# Patient Record
Sex: FEMALE | Race: WHITE | NOT HISPANIC OR LATINO | Employment: UNEMPLOYED | URBAN - NONMETROPOLITAN AREA
[De-identification: names, ages, dates, MRNs, and addresses within clinical notes are randomized per-mention and may not be internally consistent; named-entity substitution may affect disease eponyms.]

---

## 2016-08-29 LAB
EXTERNAL ABO GROUPING: NORMAL
EXTERNAL ANTIBODY SCREEN: NEGATIVE
EXTERNAL CHLAMYDIA SCREEN: NEGATIVE
EXTERNAL GONORRHEA SCREEN: NEGATIVE
EXTERNAL HEMATOCRIT: 37 %
EXTERNAL HEMOGLOBIN: 12.5 G/DL
EXTERNAL HEPATITIS B SURFACE ANTIGEN: NEGATIVE
EXTERNAL RH FACTOR: POSITIVE
EXTERNAL RUBELLA QUALITATIVE: NORMAL
EXTERNAL URINE DRUG SCREEN: NORMAL
HIV1 AB SPEC QL IA.RAPID: NEGATIVE

## 2017-01-16 ENCOUNTER — HOSPITAL ENCOUNTER (OUTPATIENT)
Facility: HOSPITAL | Age: 22
Discharge: HOME | End: 2017-01-16
Attending: OBSTETRICS & GYNECOLOGY | Admitting: OBSTETRICS & GYNECOLOGY

## 2017-01-16 ENCOUNTER — HOSPITAL ENCOUNTER (OUTPATIENT)
Dept: LABOR AND DELIVERY | Facility: HOSPITAL | Age: 22
Discharge: HOME | End: 2017-01-16

## 2017-01-16 VITALS
SYSTOLIC BLOOD PRESSURE: 122 MMHG | HEIGHT: 63 IN | RESPIRATION RATE: 18 BRPM | BODY MASS INDEX: 19.42 KG/M2 | TEMPERATURE: 98.6 F | HEART RATE: 86 BPM | DIASTOLIC BLOOD PRESSURE: 64 MMHG | WEIGHT: 109.6 LBS

## 2017-01-16 RX ORDER — ACETAMINOPHEN 325 MG/1
650 TABLET ORAL EVERY 6 HOURS PRN
Status: ON HOLD | COMMUNITY
End: 2017-01-19

## 2017-01-16 NOTE — IP AVS SNAPSHOT
AFTER VISIT SUMMARY             Florencia Vitale           About your hospitalization     You were admitted on:  January 16, 2017 You last received care in the:  [facility] LABOR DELIVERY       Procedures & Surgeries         Medications    If you or your caregiver advised us that you are currently taking a medication and that medication is marked below as “Resume”, this simply indicates that we have reviewed those medications to make sure our new therapy recommendations do not interfere.  If you have concerns about medications other than those new ones which we are prescribing today, please consult the physician who prescribed them (or your primary physician).  Our review of your home medications is not meant to indicate that we are directing their use.             Your Medications      ASK your doctor about these medications     acetaminophen 325 MG tablet   Take 650 mg by mouth Every 6 (Six) Hours As Needed for mild pain (1-3).   Commonly known as:  TYLENOL                      Your Medications      ASK your doctor about these medications           Morning Noon Evening Bedtime As Needed    acetaminophen 325 MG tablet   Take 650 mg by mouth Every 6 (Six) Hours As Needed for mild pain (1-3).   Commonly known as:  TYLENOL                                         Instructions for After Discharge        Discharge References/Attachments     INTRAUTERINE GROWTH RESTRICTION (ENGLISH)    OLIGOHYDRAMNIOS (ENGLISH)       Follow-ups for After Discharge        Scheduled Appointments     Jan 23, 2017  4:00 PM EST   NST with COR LD PROCEDURE ROOM   [facility] LABOR AND DELIVERT PROCEDURES ([place])    [address]  [facility] [address]   [phone]              [employer] Signup     [facility] [employer] allows you to send messages to your doctor, view your test results, renew your prescriptions, schedule appointments, and more. To sign up, go to [employer] and click on the Sign Up Now link  in the New User? box. Enter your [facility] Activation Code exactly as it appears below along with the last four digits of your Social Security Number and your Date of Birth () to complete the sign-up process. If you do not sign up before the expiration date, you must request a new code.    [facility] Activation Code: [email or web address]  Expires: 2017 11:34 AM    If you have questions, you can email [email or web address] or call [phone] to talk to our [facility] staff. Remember, [facility] is NOT to be used for urgent needs. For medical emergencies, dial 911.           Summary of Your Hospitalization        Reason for Hospitalization     Your primary diagnosis was:  Not on File      Care Providers     Provider Service Role Specialty    Leon Cooney DO -- Attending Provider Obstetrics and Gynecology      Your Allergies  Date Reviewed: 2017    No active allergies      Patient Belongings Returned     Document Return of Belongings Flowsheet     Were the patient bedside belongings sent home?   --   Belongings Retrieved from Security & Sent Home   --    Belongings Sent to Safe   --   Medications Retrieved from Pharmacy & Sent Home   --              More Information      Intrauterine Growth Restriction  Intrauterine growth restriction (IUGR) is when your baby is not growing normally during your pregnancy. A baby with IUGR is smaller than it should be and may weigh less than normal at birth.   IUGR can result if there is a problem with the organ that supplies your baby with oxygen and nutrition (placenta). Usually, there is no way to prevent this type of problem.   CAUSES   The most common cause of IUGR is a problem with the placenta or umbilical cord that causes your developing baby to get less oxygen or nutrition than needed. Other causes include:  · Poor maternal nutrition.  · Chemicals found in substances such as cigarettes, alcohol, and some illegal drugs.  · Some prescription  medicines.  · Congenital defects.  · Genetic disorders.  · An infection.  · Carrying more than one baby, such as twins or triplets (multiple gestations).  RISK FACTORS  This condition is more likely to develop in women who:  · Are over the age of 35 at the time of delivery (advanced maternal age).  · Have medical conditions such as high blood pressure, diabetes, heart or kidney disease, anemia, or conditions that increase the risk for blood clotting.  · Live at a very high altitude during pregnancy.  · Have a personal history or family history of IUGR.  · Take medicines during pregnancy that are linked with congenital disabilities.  · Have a personal or family history of a genetic disorder.  · Come into contact with infected cat feces (toxoplasmosis).  · Come into contact with chickenpox (varicella) or [place] citizen measles (rubella).  · Have or are at risk of getting an infectious disease such as syphilis, HIV, or herpes.  · Eat poorly during their pregnancy.  · Weigh less than 100 pounds.  · Have a family history of multiple gestations.  · Have had infertility treatments.    · Use tobacco, illegal drugs, or drink alcohol during pregnancy.  SYMPTOMS  IUGR does not cause many symptoms. You might notice that your baby does not move or kick very often. Also, your belly may not be as big as expected for the stage of your pregnancy.  DIAGNOSIS  This condition is diagnosed with physical and prenatal exams. Your health care provider will measure the size of your baby inside your womb during a routine screening exam using sound waves (ultrasound). Your health care provider will compare the size of your baby to the size of other babies at the same stage of development (gestational age). Your health care provider will diagnose IUGR if your baby is smaller than 90 percent of all other babies at the same gestational age.     You may also have tests to find the cause of IUGR. These can include:  · Having fluid removed from your womb  to check for signs of infection or a congenital disability (amniocentesis).  · A series of tests to monitor your baby's health (fetal monitoring).  TREATMENT   In most cases, treatment for this condition focuses on stopping the cause of your baby's small growth. Your health care providers will monitor your pregnancy closely and help you manage your pregnancy. If this condition is caused by a placenta problem and the baby is not getting enough blood, treatment may include:   · Medicine to start labor and deliver your baby early (induction).  ·  delivery. In this procedure, your baby is delivered through a cut (incision) in the abdomen and womb (uterus).  HOME CARE INSTRUCTIONS   · Make sure you are eating enough calories and gaining enough weight.  · Eat a balanced diet. Work with a nutrition specialist (dietitian), if needed.    · Rest as needed. Try to get at least eight hours of sleep every night.  · Do not drink alcohol.    · Do not use illegal drugs.    · Do not use any tobacco products, including cigarettes, chewing tobacco, or e-cigarettes. If you need help quitting, ask your health care provider.  · Talk to your health care provider about steps you can take to avoid infections.  · Take medicines, vitamins, and mineral supplements only as directed by your health care provider. Make sure that your health care provider knows about all of the prescribed or over-the-counter medicines, supplements, vitamins, eye drops, and creams that you are using.  · Keep all follow-up visits as directed by your health care provider. This is important.  SEEK MEDICAL CARE IF:   · Your baby is not moving as often as before.     This information is not intended to replace advice given to you by your health care provider. Make sure you discuss any questions you have with your health care provider.     Document Released: 2009 Document Revised: 2016 Document Reviewed: 2015  Outline App Patient  Education ©2016 Elsevier Inc.          Oligohydramnios  An unborn baby (fetus) lives in the mother's uterus in a sac of amniotic fluid. This fluid:  · Protects the fetus from trauma.  · Helps the fetus move freely inside the uterus.  · Helps the fetal lungs, kidneys, and digestive system develop.  · Protects the baby from infections.    Oligohydramnios is when there is not enough amniotic fluid in the amniotic sac. If this happens early in pregnancy, a fetus might not develop normally. If this happens in the second half of a pregnancy, a fetus might not grow as much as it should and could cause problems during delivery.    Oligohydramnios can also cause:  · Pregnancy loss (miscarriage).  · Premature birth.  · Deformities of the face or body.  · Problems with muscles and bones.  · Pressure and compression on the umbilical cord, which decreases oxygen to the fetus.  · Lung problems.  · Stillbirth.  CAUSES  A cause cannot always be found. However, possible causes include:  · A leak or a tear in the amniotic sac.  · A problem with the placenta.  · Having identical twins who share the same placenta.  · A fetal birth defect. This is usually something in the fetal kidneys or urinary tract that has not developed as it should.  · A pregnancy that goes past the due date.  · Something that affects the mother's body, such as:    Dehydration.    High blood pressure.    Diabetes.    Some medications (examples include ibuprofen and blood pressure medicines).     A disease that affects the skin, joints, kidneys, and other organs (systemic lupus).    Birth defects.  SYMPTOMS  · There may be no symptoms.   · Leaking fluid from the vagina.  · Measuring smaller than usual uterus at a routine pregnancy exam.  DIAGNOSIS  To diagnose and evaluate oligohydramnios, your caregiver may:  · Order a prenatal ultrasound test. This test:    Measures the amniotic fluid level. This will show if the amount of fluid is right for the stage of  pregnancy.    Checks the fetal kidneys.    Checks fetal growth.    Evaluates the placenta.  · Confirm that you broke your water, if this is suspected by your caregiver.  · Order a nonstress test. This noninvasive test is an assessment of fetal well-being. It monitors the fetal heart rate patterns over a 20-minute period.  · Order a biophysical profile. This test measures and evaluates 5 observations of the baby (results of nonstress testing, fetal breathing, movements, muscle tone, and amount of amniotic fluid).  · Assess fetal kick counts. Tell your caregiver if the baby appears to become less active.  · Order a uterine artery Doppler study. This is a type of ultrasound. It can show if enough blood and nourishment are getting to the fetus through the placenta and umbilical cord.  · Check your blood pressure.  · Check your blood sugar.  TREATMENT  Treatment will depend on how low the fluid is, how far along in the pregnancy you are, and your overall health. Treatment options include:  · Watching and waiting. You will need to be checked more often than normal.  · Increasing your fluid intake. This may be done by mouth, or you might get the fluids through the vein (intravenously, IV).  · Delivering the baby if recommended by your caregiver.  HOME CARE INSTRUCTIONS  · Only take medicine as directed by your caregiver, especially if you have a medical problem (diabetes, high blood pressure).  · Follow your caregiver's instructions regarding physical activity, especially if you have a medical problem (diabetes, high blood pressure).  · Keep all prenatal care appointments.  · Rest as much as possible. Your caregiver may put you on bed rest.  · Drink enough fluids to keep your urine clear or pale yellow.  · Eat a healthy and nourishing diet.  · Do not smoke, drink alcohol, or take illegal drugs.   SEEK MEDICAL CARE IF:  · You have any questions or worries about your pregnancy.  · You notice a decrease in fetal  movement.  SEEK IMMEDIATE MEDICAL CARE IF:   · Fluid comes out of your vagina.  · You start to have labor pains (contractions).  · You have a fever.     This information is not intended to replace advice given to you by your health care provider. Make sure you discuss any questions you have with your health care provider.     Document Released: 04/03/2012 Document Revised: 01/08/2016 Document Reviewed: 04/03/2012  [employer] Interactive Patient Education ©2016 Elsevier Inc.            SYMPTOMS OF A STROKE    Call 911 or have someone take you to the Emergency Department if you have any of the following:    · Sudden numbness or weakness of your face, arm or leg especially on one side of the body  · Sudden confusion, diffiiculty speaking or trouble understanding   · Changes in your vision or loss of sight in one eye  · Sudden severe headache with no known cause  · sudden dizziness, trouble walking, loss of balance or coordination    It is important to seek emergency care right away if you have further stroke symptoms. If you get emergency help quickly, the powerful clot-dissolving medicines can reduce the disabilities caused by a stroke.     For more information:    American Stroke Association  1-262-9-STROKE  www.strokeassociation.org           IF YOU SMOKE OR USE TOBACCO PLEASE READ THE FOLLOWING:    Why is smoking bad for me?  Smoking increases the risk of heart disease, lung disease, vascular disease, stroke, and cancer.     If you smoke, STOP!    If you would like more information on quitting smoking, please visit the [employer] website: [email or web address]   This link will provide additional resources including the QUIT line and the Beat the Pack support groups.     For more information:    American Cancer Society  (682) 895-1118    American Heart Association  1-873.537.9584               YOU ARE THE MOST IMPORTANT FACTOR IN YOUR RECOVERY.     Follow all instructions  carefully.     I have reviewed my discharge instructions with my nurse, including the following information, if applicable:     Information about my illness and diagnosis   Follow up appointments (including lab draws)   Wound Care   Equipment Needs   Medications (new and continuing) along with side effects   Preventative information such as vaccines and smoking cessations   Diet   Pain   I know when to contact my Doctor's office or seek emergency care      I want my nurse to describe the side effects of my medications: YES NO   If the answer is no, I understand the side effects of my medications: YES NO   My nurse described the side effects of my medications in a way that I could understand: YES NO   I have taken my personal belongings and my own medications with me at discharge: YES NO            I have received this information and my questions have been answered. I have discussed any concerns I see with this plan with the nurse or physician. I understand these instructions.    Signature of Patient or Responsible Person: _____________________________________    Date: _________________  Time: __________________    Signature of Healthcare Provider: _______________________________________  Date: _________________  Time: __________________

## 2017-01-16 NOTE — NON STRESS TEST
Florencia Vitale, a  at 35w1d with an AIDA of 2017, by Last Menstrual Period, was seen at [facility] LABOR DELIVERY for a nonstress test.    Chief Complaint   Patient presents with   • Non-stress Test     iugr                Anna Marie Bob RN

## 2017-01-18 ENCOUNTER — HOSPITAL ENCOUNTER (OUTPATIENT)
Facility: HOSPITAL | Age: 22
Discharge: HOME | End: 2017-01-18
Attending: OBSTETRICS & GYNECOLOGY | Admitting: OBSTETRICS & GYNECOLOGY

## 2017-01-18 PROBLEM — Z34.90 PREGNANT: Status: ACTIVE | Noted: 2017-01-18

## 2017-01-18 LAB
A1 MICROGLOB PLACENTAL VAG QL: NEGATIVE
AMPHET+METHAMPHET UR QL: NEGATIVE
BARBITURATES UR QL SCN: NEGATIVE
BENZODIAZ UR QL SCN: NEGATIVE
BILIRUB UR QL STRIP: NEGATIVE
BUPRENORPHINE+NOR UR QL SCN: NEGATIVE
CANNABINOIDS SERPL QL: POSITIVE
CLARITY UR: CLEAR
COCAINE UR QL: NEGATIVE
COLOR UR: YELLOW
GLUCOSE UR STRIP-MCNC: NEGATIVE MG/DL
HGB UR QL STRIP.AUTO: NEGATIVE
KETONES UR QL STRIP: ABNORMAL
LEUKOCYTE ESTERASE UR QL STRIP.AUTO: NEGATIVE
METHADONE UR QL SCN: NEGATIVE
NITRITE UR QL STRIP: NEGATIVE
OPIATES UR QL: NEGATIVE
OXYCODONE UR QL SCN: NEGATIVE
PCP UR QL SCN: NEGATIVE
PH UR STRIP.AUTO: 7.5 [PH] (ref 5–8)
PROPOXYPH UR QL: NEGATIVE
PROT UR QL STRIP: NEGATIVE
SP GR UR STRIP: 1.01 (ref 1–1.03)
UROBILINOGEN UR QL STRIP: ABNORMAL

## 2017-01-19 ENCOUNTER — ANESTHESIA (OUTPATIENT)
Dept: LABOR AND DELIVERY | Facility: HOSPITAL | Age: 22
End: 2017-01-19

## 2017-01-19 ENCOUNTER — ANESTHESIA EVENT (OUTPATIENT)
Dept: LABOR AND DELIVERY | Facility: HOSPITAL | Age: 22
End: 2017-01-19

## 2017-01-19 ENCOUNTER — HOSPITAL ENCOUNTER (INPATIENT)
Facility: HOSPITAL | Age: 22
LOS: 2 days | Discharge: HOME | End: 2017-01-21
Attending: OBSTETRICS & GYNECOLOGY | Admitting: OBSTETRICS & GYNECOLOGY

## 2017-01-19 PROBLEM — Z34.90 PREGNANCY: Status: ACTIVE | Noted: 2017-01-19

## 2017-01-19 PROBLEM — O47.00 PRETERM CONTRACTIONS: Status: ACTIVE | Noted: 2017-01-19

## 2017-01-19 LAB
ABO GROUP BLD: NORMAL
AMPHET+METHAMPHET UR QL: NEGATIVE
BARBITURATES UR QL SCN: NEGATIVE
BASOPHILS # BLD AUTO: 0.03 10*3/MM3 (ref 0–0.3)
BASOPHILS NFR BLD AUTO: 0.2 % (ref 0–2)
BENZODIAZ UR QL SCN: NEGATIVE
BLD GP AB SCN SERPL QL: NEGATIVE
BUPRENORPHINE+NOR UR QL SCN: NEGATIVE
CANNABINOIDS SERPL QL: POSITIVE
COCAINE UR QL: NEGATIVE
DEPRECATED RDW RBC AUTO: 43.1 FL (ref 37–54)
EOSINOPHIL # BLD AUTO: 0.04 10*3/MM3 (ref 0–0.7)
EOSINOPHIL NFR BLD AUTO: 0.3 % (ref 0–5)
ERYTHROCYTE [DISTWIDTH] IN BLOOD BY AUTOMATED COUNT: 13.5 % (ref 11.5–14.5)
HCT VFR BLD AUTO: 33 % (ref 37–47)
HGB BLD-MCNC: 10.5 G/DL (ref 12–16)
IMM GRANULOCYTES # BLD: 0.03 10*3/MM3 (ref 0–0.03)
IMM GRANULOCYTES NFR BLD: 0.2 % (ref 0–0.5)
LYMPHOCYTES # BLD AUTO: 1.65 10*3/MM3 (ref 1–3)
LYMPHOCYTES NFR BLD AUTO: 13.1 % (ref 21–51)
MCH RBC QN AUTO: 29 PG (ref 27–33)
MCHC RBC AUTO-ENTMCNC: 31.8 G/DL (ref 33–37)
MCV RBC AUTO: 91.2 FL (ref 80–94)
METHADONE UR QL SCN: NEGATIVE
MONOCYTES # BLD AUTO: 0.88 10*3/MM3 (ref 0.1–0.9)
MONOCYTES NFR BLD AUTO: 7 % (ref 0–10)
NEUTROPHILS # BLD AUTO: 9.93 10*3/MM3 (ref 1.4–6.5)
NEUTROPHILS NFR BLD AUTO: 79.2 % (ref 30–70)
OPIATES UR QL: NEGATIVE
OXYCODONE UR QL SCN: NEGATIVE
PCP UR QL SCN: NEGATIVE
PLATELET # BLD AUTO: 242 10*3/MM3 (ref 130–400)
PMV BLD AUTO: 12.1 FL (ref 6–10)
PROPOXYPH UR QL: NEGATIVE
RBC # BLD AUTO: 3.62 10*6/MM3 (ref 4.2–5.4)
RH BLD: POSITIVE
WBC NRBC COR # BLD: 12.56 10*3/MM3 (ref 4.5–12.5)

## 2017-01-19 PROCEDURE — 80307 DRUG TEST PRSMV CHEM ANLYZR: CPT | Performed by: OBSTETRICS & GYNECOLOGY

## 2017-01-19 PROCEDURE — 59025 FETAL NON-STRESS TEST: CPT

## 2017-01-19 PROCEDURE — 86900 BLOOD TYPING SEROLOGIC ABO: CPT

## 2017-01-19 PROCEDURE — 85025 COMPLETE CBC W/AUTO DIFF WBC: CPT | Performed by: OBSTETRICS & GYNECOLOGY

## 2017-01-19 PROCEDURE — 25010000002 ROPIVACAINE PER 1 MG: Performed by: NURSE ANESTHETIST, CERTIFIED REGISTERED

## 2017-01-19 PROCEDURE — C1755 CATHETER, INTRASPINAL: HCPCS

## 2017-01-19 PROCEDURE — 86850 RBC ANTIBODY SCREEN: CPT

## 2017-01-19 PROCEDURE — 86901 BLOOD TYPING SEROLOGIC RH(D): CPT

## 2017-01-19 PROCEDURE — 25010000002 FENTANYL CITRATE (PF) 100 MCG/2ML SOLUTION: Performed by: NURSE ANESTHETIST, CERTIFIED REGISTERED

## 2017-01-19 RX ORDER — FENTANYL CITRATE 50 UG/ML
INJECTION, SOLUTION INTRAMUSCULAR; INTRAVENOUS AS NEEDED
Status: DISCONTINUED | OUTPATIENT
Start: 2017-01-19 | End: 2017-02-10 | Stop reason: SURG

## 2017-01-19 RX ORDER — ONDANSETRON 4 MG/1
4 TABLET, ORALLY DISINTEGRATING ORAL EVERY 6 HOURS PRN
Status: DISCONTINUED | OUTPATIENT
Start: 2017-01-19 | End: 2017-01-19 | Stop reason: HOSPADM

## 2017-01-19 RX ORDER — ONDANSETRON 4 MG/1
4 TABLET, FILM COATED ORAL EVERY 6 HOURS PRN
Status: DISCONTINUED | OUTPATIENT
Start: 2017-01-19 | End: 2017-01-19 | Stop reason: HOSPADM

## 2017-01-19 RX ORDER — PROMETHAZINE HYDROCHLORIDE 25 MG/ML
12.5 INJECTION, SOLUTION INTRAMUSCULAR; INTRAVENOUS EVERY 6 HOURS PRN
Status: DISCONTINUED | OUTPATIENT
Start: 2017-01-19 | End: 2017-01-19 | Stop reason: HOSPADM

## 2017-01-19 RX ORDER — GLYCERIN/PROPYLENE GLYCOL/WATR
1 SOLUTION, NON-ORAL VAGINAL AS NEEDED
Status: DISCONTINUED | OUTPATIENT
Start: 2017-01-19 | End: 2017-01-19 | Stop reason: HOSPADM

## 2017-01-19 RX ORDER — LIDOCAINE HYDROCHLORIDE AND EPINEPHRINE 20; 5 MG/ML; UG/ML
INJECTION, SOLUTION EPIDURAL; INFILTRATION; INTRACAUDAL; PERINEURAL AS NEEDED
Status: DISCONTINUED | OUTPATIENT
Start: 2017-01-19 | End: 2017-02-10 | Stop reason: SURG

## 2017-01-19 RX ORDER — IBUPROFEN 800 MG/1
800 TABLET ORAL 3 TIMES DAILY
Status: DISCONTINUED | OUTPATIENT
Start: 2017-01-19 | End: 2017-01-19 | Stop reason: SDUPTHER

## 2017-01-19 RX ORDER — BISACODYL 10 MG
10 SUPPOSITORY, RECTAL RECTAL DAILY PRN
Status: DISCONTINUED | OUTPATIENT
Start: 2017-01-20 | End: 2017-01-21 | Stop reason: HOSPADM

## 2017-01-19 RX ORDER — SODIUM CHLORIDE, SODIUM LACTATE, POTASSIUM CHLORIDE, CALCIUM CHLORIDE 600; 310; 30; 20 MG/100ML; MG/100ML; MG/100ML; MG/100ML
125 INJECTION, SOLUTION INTRAVENOUS CONTINUOUS
Status: DISCONTINUED | OUTPATIENT
Start: 2017-01-19 | End: 2017-01-20

## 2017-01-19 RX ORDER — LIDOCAINE HYDROCHLORIDE 10 MG/ML
INJECTION, SOLUTION INFILTRATION; PERINEURAL
Status: DISCONTINUED
Start: 2017-01-19 | End: 2017-01-19 | Stop reason: WASHOUT

## 2017-01-19 RX ORDER — DOCUSATE SODIUM 100 MG/1
100 CAPSULE, LIQUID FILLED ORAL DAILY
Status: DISCONTINUED | OUTPATIENT
Start: 2017-01-20 | End: 2017-01-21 | Stop reason: HOSPADM

## 2017-01-19 RX ORDER — METOCLOPRAMIDE 10 MG/1
10 TABLET ORAL ONCE
Status: DISCONTINUED | OUTPATIENT
Start: 2017-01-20 | End: 2017-01-20

## 2017-01-19 RX ORDER — METHYLERGONOVINE MALEATE 0.2 MG/ML
200 INJECTION INTRAVENOUS AS NEEDED
Status: DISCONTINUED | OUTPATIENT
Start: 2017-01-19 | End: 2017-01-19 | Stop reason: HOSPADM

## 2017-01-19 RX ORDER — MAGNESIUM HYDROXIDE 1200 MG/15ML
3000 LIQUID ORAL ONCE
Status: DISCONTINUED | OUTPATIENT
Start: 2017-01-19 | End: 2017-01-19 | Stop reason: HOSPADM

## 2017-01-19 RX ORDER — ACETAMINOPHEN 325 MG/1
650 TABLET ORAL EVERY 4 HOURS PRN
Status: DISCONTINUED | OUTPATIENT
Start: 2017-01-19 | End: 2017-01-19 | Stop reason: HOSPADM

## 2017-01-19 RX ORDER — ROPIVACAINE HYDROCHLORIDE 2 MG/ML
INJECTION, SOLUTION EPIDURAL; INFILTRATION; PERINEURAL AS NEEDED
Status: DISCONTINUED | OUTPATIENT
Start: 2017-01-19 | End: 2017-02-10 | Stop reason: SURG

## 2017-01-19 RX ORDER — CARBOPROST TROMETHAMINE 250 UG/ML
250 INJECTION, SOLUTION INTRAMUSCULAR AS NEEDED
Status: DISCONTINUED | OUTPATIENT
Start: 2017-01-19 | End: 2017-01-19 | Stop reason: HOSPADM

## 2017-01-19 RX ORDER — LANOLIN 100 %
OINTMENT (GRAM) TOPICAL
Status: DISCONTINUED | OUTPATIENT
Start: 2017-01-19 | End: 2017-01-21 | Stop reason: HOSPADM

## 2017-01-19 RX ORDER — PROMETHAZINE HYDROCHLORIDE 25 MG/ML
12.5 INJECTION, SOLUTION INTRAMUSCULAR; INTRAVENOUS EVERY 6 HOURS PRN
Status: DISCONTINUED | OUTPATIENT
Start: 2017-01-19 | End: 2017-01-21 | Stop reason: HOSPADM

## 2017-01-19 RX ORDER — ONDANSETRON 2 MG/ML
4 INJECTION INTRAMUSCULAR; INTRAVENOUS EVERY 6 HOURS PRN
Status: DISCONTINUED | OUTPATIENT
Start: 2017-01-19 | End: 2017-01-21 | Stop reason: HOSPADM

## 2017-01-19 RX ORDER — LIDOCAINE HYDROCHLORIDE 10 MG/ML
5 INJECTION, SOLUTION INFILTRATION; PERINEURAL AS NEEDED
Status: DISCONTINUED | OUTPATIENT
Start: 2017-01-19 | End: 2017-01-19 | Stop reason: HOSPADM

## 2017-01-19 RX ORDER — TERBUTALINE SULFATE 1 MG/ML
0.25 INJECTION, SOLUTION SUBCUTANEOUS AS NEEDED
Status: DISCONTINUED | OUTPATIENT
Start: 2017-01-19 | End: 2017-01-19 | Stop reason: HOSPADM

## 2017-01-19 RX ORDER — ACETAMINOPHEN 325 MG/1
650 TABLET ORAL EVERY 4 HOURS PRN
Status: DISCONTINUED | OUTPATIENT
Start: 2017-01-19 | End: 2017-01-21 | Stop reason: HOSPADM

## 2017-01-19 RX ORDER — ONDANSETRON 4 MG/1
4 TABLET, ORALLY DISINTEGRATING ORAL EVERY 6 HOURS PRN
Status: DISCONTINUED | OUTPATIENT
Start: 2017-01-19 | End: 2017-01-21 | Stop reason: HOSPADM

## 2017-01-19 RX ORDER — LIDOCAINE HYDROCHLORIDE 10 MG/ML
INJECTION, SOLUTION INFILTRATION; PERINEURAL AS NEEDED
Status: DISCONTINUED | OUTPATIENT
Start: 2017-01-19 | End: 2017-02-10 | Stop reason: SURG

## 2017-01-19 RX ORDER — IBUPROFEN 800 MG/1
800 TABLET ORAL EVERY 8 HOURS SCHEDULED
Status: DISCONTINUED | OUTPATIENT
Start: 2017-01-19 | End: 2017-01-19 | Stop reason: HOSPADM

## 2017-01-19 RX ORDER — PROMETHAZINE HYDROCHLORIDE 12.5 MG/1
12.5 SUPPOSITORY RECTAL EVERY 6 HOURS PRN
Status: DISCONTINUED | OUTPATIENT
Start: 2017-01-19 | End: 2017-01-19

## 2017-01-19 RX ORDER — MISOPROSTOL 100 UG/1
600 TABLET ORAL ONCE
Status: COMPLETED | OUTPATIENT
Start: 2017-01-19 | End: 2017-01-19

## 2017-01-19 RX ORDER — MISOPROSTOL 100 UG/1
600 TABLET ORAL AS NEEDED
Status: DISCONTINUED | OUTPATIENT
Start: 2017-01-19 | End: 2017-01-19 | Stop reason: HOSPADM

## 2017-01-19 RX ORDER — FENTANYL CITRATE 50 UG/ML
INJECTION, SOLUTION INTRAMUSCULAR; INTRAVENOUS
Status: DISCONTINUED
Start: 2017-01-19 | End: 2017-01-21 | Stop reason: HOSPADM

## 2017-01-19 RX ORDER — SODIUM CHLORIDE 0.9 % (FLUSH) 0.9 %
1-10 SYRINGE (ML) INJECTION AS NEEDED
Status: DISCONTINUED | OUTPATIENT
Start: 2017-01-19 | End: 2017-01-21 | Stop reason: HOSPADM

## 2017-01-19 RX ORDER — PROMETHAZINE HYDROCHLORIDE 25 MG/1
25 TABLET ORAL EVERY 6 HOURS PRN
Status: DISCONTINUED | OUTPATIENT
Start: 2017-01-19 | End: 2017-01-21 | Stop reason: HOSPADM

## 2017-01-19 RX ORDER — ROPIVACAINE HYDROCHLORIDE 2 MG/ML
INJECTION, SOLUTION EPIDURAL; INFILTRATION; PERINEURAL
Status: DISCONTINUED
Start: 2017-01-19 | End: 2017-01-21 | Stop reason: HOSPADM

## 2017-01-19 RX ORDER — OXYTOCIN/RINGER'S LACTATE 20/1000 ML
2 PLASTIC BAG, INJECTION (ML) INTRAVENOUS
Status: DISCONTINUED | OUTPATIENT
Start: 2017-01-19 | End: 2017-01-19 | Stop reason: HOSPADM

## 2017-01-19 RX ORDER — SODIUM CHLORIDE 0.9 % (FLUSH) 0.9 %
1-10 SYRINGE (ML) INJECTION AS NEEDED
Status: DISCONTINUED | OUTPATIENT
Start: 2017-01-19 | End: 2017-01-19 | Stop reason: HOSPADM

## 2017-01-19 RX ORDER — PROMETHAZINE HYDROCHLORIDE 25 MG/1
12.5 TABLET ORAL EVERY 6 HOURS PRN
Status: DISCONTINUED | OUTPATIENT
Start: 2017-01-19 | End: 2017-01-19

## 2017-01-19 RX ORDER — PROMETHAZINE HYDROCHLORIDE 12.5 MG/1
12.5 SUPPOSITORY RECTAL EVERY 6 HOURS PRN
Status: DISCONTINUED | OUTPATIENT
Start: 2017-01-19 | End: 2017-01-21 | Stop reason: HOSPADM

## 2017-01-19 RX ORDER — ZOLPIDEM TARTRATE 5 MG/1
5 TABLET ORAL NIGHTLY PRN
Status: DISCONTINUED | OUTPATIENT
Start: 2017-01-19 | End: 2017-01-21 | Stop reason: HOSPADM

## 2017-01-19 RX ORDER — ONDANSETRON 2 MG/ML
4 INJECTION INTRAMUSCULAR; INTRAVENOUS EVERY 6 HOURS PRN
Status: DISCONTINUED | OUTPATIENT
Start: 2017-01-19 | End: 2017-01-19 | Stop reason: HOSPADM

## 2017-01-19 RX ORDER — ONDANSETRON 4 MG/1
4 TABLET, FILM COATED ORAL EVERY 6 HOURS PRN
Status: DISCONTINUED | OUTPATIENT
Start: 2017-01-19 | End: 2017-01-21 | Stop reason: HOSPADM

## 2017-01-19 RX ADMIN — SODIUM CHLORIDE, POTASSIUM CHLORIDE, SODIUM LACTATE AND CALCIUM CHLORIDE 125 ML/HR: 600; 310; 30; 20 INJECTION, SOLUTION INTRAVENOUS at 19:42

## 2017-01-19 RX ADMIN — LIDOCAINE HYDROCHLORIDE 3 ML: 10 INJECTION, SOLUTION INFILTRATION; PERINEURAL at 18:38

## 2017-01-19 RX ADMIN — ROPIVACAINE HYDROCHLORIDE 14 ML: 2 INJECTION, SOLUTION EPIDURAL; INFILTRATION at 18:40

## 2017-01-19 RX ADMIN — LIDOCAINE HYDROCHLORIDE AND EPINEPHRINE 3 ML: 20; 5 INJECTION, SOLUTION EPIDURAL; INFILTRATION; INTRACAUDAL; PERINEURAL at 18:41

## 2017-01-19 RX ADMIN — FENTANYL CITRATE 100 MCG: 50 INJECTION, SOLUTION INTRAMUSCULAR; INTRAVENOUS at 18:40

## 2017-01-19 RX ADMIN — MISOPROSTOL 600 MCG: 100 TABLET ORAL at 21:22

## 2017-01-19 RX ADMIN — SODIUM CHLORIDE, POTASSIUM CHLORIDE, SODIUM LACTATE AND CALCIUM CHLORIDE 125 ML/HR: 600; 310; 30; 20 INJECTION, SOLUTION INTRAVENOUS at 17:36

## 2017-01-19 RX ADMIN — AMPICILLIN SODIUM 2 G: 2 INJECTION, POWDER, FOR SOLUTION INTRAMUSCULAR; INTRAVENOUS at 17:41

## 2017-01-19 RX ADMIN — OXYTOCIN 2 MILLI-UNITS/MIN: 10 INJECTION INTRAVENOUS at 20:06

## 2017-01-19 NOTE — NURSING NOTE
Adm to room 229 to the services of Dr. Alicia gerard 20 y/o  @ 35 3/7 weeks gest that arrived via EMS.  C/O ?SROM @ 1730 and contractions since 1700.

## 2017-01-19 NOTE — NON STRESS TEST
Florencia Vitale, a  at 35w3d with an AIDA of 2017, by Last Menstrual Period, was seen at [facility] LABOR DELIVERY for a nonstress test.    Chief Complaint   Patient presents with   • Contractions     LEAKING FLUID       Interpretation A  Nonstress Test Interpretation A: Reactive (17 : Chloe Madsen, RN)  Comments A: Verified per Ayanna Mcpherson RN (17 : Chloe Madsen RN)        Reason for test: OB Triage  Date of Test: 2017  Time frame of test: 20 min  RN NST Interpretation: Reactive  FETAL NONSTRESS TEST REPORT      Gestational age: As recorded in hospital chart    Indication: See hospital chart    Baseline fetal heart rate: 125    Accelerations: Present    Decelerations: Few variables present    Conclusion: Reactive

## 2017-01-19 NOTE — NON STRESS TEST
Florencia Vitale, a  at 35w4d with an AIDA of 2017, by Last Menstrual Period, was seen at [facility] LABOR DELIVERY for a nonstress test.    Chief Complaint   Patient presents with   • Non-stress Test     PT FROM [facility] WITH ADVANCED DILATION.  PT STATES SHE HAS REG UC'S THAT ARE 5 MINS APART.  PT DENIES ANY VAG BLEEDING AND NO LEAKING OF FLUID.  PT HAS +FM.  PT ALSO WITH IUGR AND OLIGO.       Interpretation A  Nonstress Test Interpretation A: Reactive (17 1823 : Tosha Murry RN)        VERIFIED BY LAURIE THAYER RN

## 2017-01-19 NOTE — IP AVS SNAPSHOT
AFTER VISIT SUMMARY             Florencia Vitale           About your hospitalization     You were discharged on:  January 21, 2017 You last received care in the:  [facility]     Unit phone number:  [phone]       Medications    If you or your caregiver advised us that you are currently taking a medication and that medication is marked below as “Resume”, this simply indicates that we have reviewed those medications to make sure our new therapy recommendations do not interfere.  If you have concerns about medications other than those new ones which we are prescribing today, please consult the physician who prescribed them (or your primary physician).  Our review of your home medications is not meant to indicate that we are directing their use.             Your Medications      Your Medication List           Morning Noon Evening Bedtime As Needed    ibuprofen 800 MG tablet   Take 1 tablet by mouth Every 8 (Eight) Hours.   Commonly known as:  ADVIL,MOTRIN                                         Instructions for After Discharge        Activity Instructions     Discharge Activity Restrictions       1) No driving for 2 weeks and no longer taking narcotics.   2) Return to school / work in 6 weeks.  3)  Do not lift / push / pull more then 10 lbs.       Pelvic Rest       Nothing per vagina until cleared by physician              Diet Instructions     Diet:       Diet Texture / Consistency:  Regular              Follow-ups for After Discharge        Referrals and Follow-ups to Schedule     Follow-Up    As directed    Follow up with Dr. Pandey in 2 weeks.              Scheduled Appointments     Jan 23, 2017  4:00 PM EST   NST with [facility] PROCEDURE ROOM   [facility] AND DELIVERT PROCED[facility] ([place])    [address] [facility] [id number] [phone]            Additional instructions:      Follow-up with Dr. Cooney in three weeks on February 10, 2017@11:00am              Josefina  Signup Information     [facility] [email or web address] allows you to send messages to your doctor, view your test results, renew your prescriptions, schedule appointments, and more. To sign up, go to [employer] and click on the Sign Up Now link in the New User? box. Enter your [employer] Activation Code exactly as it appears below along with the last four digits of your Social Security Number and your Date of Birth () to complete the sign-up process. If you do not sign up before the expiration date, you must request a new code.    [employer] Activation Code: [email or web address]  Expires: 2017 11:34 AM    If you have questions, you can email [email or web address] or call [phone] to talk to our [employer] staff. Remember, [employer] is NOT to be used for urgent needs. For medical emergencies, dial 911.           Summary of Your Hospitalization        Reason for Hospitalization     Your primary diagnosis was:  Not on File    Your diagnoses also included:   Contractions, Pregnancy      Care Providers     Provider Service Role Specialty    Leon Cooney,  -- Attending Provider Obstetrics and Gynecology      Your Allergies  Date Reviewed: 2017    No active allergies      Pending Labs     Order Current Status    Tissue Exam In process        More Information      Postpartum Care After Vaginal Delivery  After you deliver your  (postpartum period), the usual stay in the hospital is 24-72 hours. If there were problems with your labor or delivery, or if you have other medical problems, you might be in the hospital longer.   While you are in the hospital, you will receive help and instructions on how to care for yourself and your  during the postpartum period.   While you are in the hospital:  · Be sure to tell your nurses if you have pain or discomfort, as well as where you feel the pain and what makes the pain worse.  · If you had an incision made near your vagina (episiotomy)  or if you had some tearing during delivery, the nurses may put ice packs on your episiotomy or tear. The ice packs may help to reduce the pain and swelling.  · If you are breastfeeding, you may feel uncomfortable contractions of your uterus for a couple of weeks. This is normal. The contractions help your uterus get back to normal size.  · It is normal to have some bleeding after delivery.    For the first 1-3 days after delivery, the flow is red and the amount may be similar to a period.    It is common for the flow to start and stop.    In the first few days, you may pass some small clots. Let your nurses know if you begin to pass large clots or your flow increases.    Do not  flush blood clots down the toilet before having the nurse look at them.    During the next 3-10 days after delivery, your flow should become more watery and pink or brown-tinged in color.    Ten to fourteen days after delivery, your flow should be a small amount of yellowish-white discharge.    The amount of your flow will decrease over the first few weeks after delivery. Your flow may stop in 6-8 weeks. Most women have had their flow stop by 12 weeks after delivery.  · You should change your sanitary pads frequently.  · Wash your hands thoroughly with soap and water for at least 20 seconds after changing pads, using the toilet, or before holding or feeding your .  · You should feel like you need to empty your bladder within the first 6-8 hours after delivery.  · In case you become weak, lightheaded, or faint, call your nurse before you get out of bed for the first time and before you take a shower for the first time.  · Within the first few days after delivery, your breasts may begin to feel tender and full. This is called engorgement. Breast tenderness usually goes away within 48-72 hours after engorgement occurs. You may also notice milk leaking from your breasts. If you are not breastfeeding, do not stimulate your breasts. Breast  stimulation can make your breasts produce more milk.  · Spending as much time as possible with your  is very important. During this time, you and your  can feel close and get to know each other. Having your  stay in your room (rooming in) will help to strengthen the bond with your .  It will give you time to get to know your  and become comfortable caring for your .  · Your hormones change after delivery. Sometimes the hormone changes can temporarily cause you to feel sad or tearful. These feelings should not last more than a few days. If these feelings last longer than that, you should talk to your caregiver.  · If desired, talk to your caregiver about methods of family planning or contraception.  · Talk to your caregiver about immunizations. Your caregiver may want you to have the following immunizations before leaving the hospital:    Tetanus, diphtheria, and pertussis (Tdap) or tetanus and diphtheria (Td) immunization. It is very important that you and your family (including grandparents) or others caring for your  are up-to-date with the Tdap or Td immunizations. The Tdap or Td immunization can help protect your  from getting ill.    Rubella immunization.    Varicella (chickenpox) immunization.    Influenza immunization. You should receive this annual immunization if you did not receive the immunization during your pregnancy.     This information is not intended to replace advice given to you by your health care provider. Make sure you discuss any questions you have with your health care provider.     Document Released: 10/14/2008 Document Revised: 2013 Document Reviewed: 2013  Elsevier Interactive Patient Education © Elsevier Inc.            SYMPTOMS OF A STROKE    Call 911 or have someone take you to the Emergency Department if you have any of the following:    · Sudden numbness or weakness of your face, arm or leg especially on one side  of the body  · Sudden confusion, diffiiculty speaking or trouble understanding   · Changes in your vision or loss of sight in one eye  · Sudden severe headache with no known cause  · sudden dizziness, trouble walking, loss of balance or coordination    It is important to seek emergency care right away if you have further stroke symptoms. If you get emergency help quickly, the powerful clot-dissolving medicines can reduce the disabilities caused by a stroke.     For more information:    American Stroke Association  3-801-6-STROKE  www.strokeassociation.org           IF YOU SMOKE OR USE TOBACCO PLEASE READ THE FOLLOWING:    Why is smoking bad for me?  Smoking increases the risk of heart disease, lung disease, vascular disease, stroke, and cancer.     If you smoke, STOP!    If you would like more information on quitting smoking, please visit the [employer] website: [email or web address]   This link will provide additional resources including the QUIT line and the Beat the Pack support groups.     For more information:    American Cancer Society  (984) 241-4723    American Heart Association  1-408.451.3283               YOU ARE THE MOST IMPORTANT FACTOR IN YOUR RECOVERY.     Follow all instructions carefully.     I have reviewed my discharge instructions with my nurse, including the following information, if applicable:     Information about my illness and diagnosis   Follow up appointments (including lab draws)   Wound Care   Equipment Needs   Medications (new and continuing) along with side effects   Preventative information such as vaccines and smoking cessations   Diet   Pain   I know when to contact my Doctor's office or seek emergency care      I want my nurse to describe the side effects of my medications: YES NO   If the answer is no, I understand the side effects of my medications: YES NO   My nurse described the side effects of my medications in a way that I could  understand: YES NO   I have taken my personal belongings and my own medications with me at discharge: YES NO            Should a home visit be schedule with you:  a notification from your provider will be made prior to the visit.  If you have any questions or concerns about the visit, contact your provider.      I have received this information and my questions have been answered. I have discussed any concerns I see with this plan with the nurse or physician. I understand these instructions.    Signature of Patient or Responsible Person: _____________________________________    Date: _________________  Time: __________________    Signature of Healthcare Provider: _______________________________________  Date: _________________  Time: __________________

## 2017-01-19 NOTE — NURSING NOTE
"No change in SVE.  Pt states that pain level is a \"0\" and that she wants to go home and that she has a doctor's appointment in the AM.  Discharge inst given to pt.  Inst to return to labor landry if any signs of labor, leaking of fluid, vaginal bleeding or decreased fetal movement.  Pt v/u of all inst.   "

## 2017-01-20 LAB
BASOPHILS # BLD AUTO: 0.03 10*3/MM3 (ref 0–0.3)
BASOPHILS NFR BLD AUTO: 0.3 % (ref 0–2)
DEPRECATED RDW RBC AUTO: 43.7 FL (ref 37–54)
EOSINOPHIL # BLD AUTO: 0.13 10*3/MM3 (ref 0–0.7)
EOSINOPHIL NFR BLD AUTO: 1.1 % (ref 0–5)
ERYTHROCYTE [DISTWIDTH] IN BLOOD BY AUTOMATED COUNT: 13.3 % (ref 11.5–14.5)
HCT VFR BLD AUTO: 31.1 % (ref 37–47)
HGB BLD-MCNC: 10.5 G/DL (ref 12–16)
IMM GRANULOCYTES # BLD: 0.04 10*3/MM3 (ref 0–0.03)
IMM GRANULOCYTES NFR BLD: 0.3 % (ref 0–0.5)
LYMPHOCYTES # BLD AUTO: 2.02 10*3/MM3 (ref 1–3)
LYMPHOCYTES NFR BLD AUTO: 16.8 % (ref 21–51)
MCH RBC QN AUTO: 30.5 PG (ref 27–33)
MCHC RBC AUTO-ENTMCNC: 33.8 G/DL (ref 33–37)
MCV RBC AUTO: 90.4 FL (ref 80–94)
MONOCYTES # BLD AUTO: 1.28 10*3/MM3 (ref 0.1–0.9)
MONOCYTES NFR BLD AUTO: 10.7 % (ref 0–10)
NEUTROPHILS # BLD AUTO: 8.49 10*3/MM3 (ref 1.4–6.5)
NEUTROPHILS NFR BLD AUTO: 70.8 % (ref 30–70)
PLATELET # BLD AUTO: 239 10*3/MM3 (ref 130–400)
PMV BLD AUTO: 12.4 FL (ref 6–10)
RBC # BLD AUTO: 3.44 10*6/MM3 (ref 4.2–5.4)
WBC NRBC COR # BLD: 11.99 10*3/MM3 (ref 4.5–12.5)

## 2017-01-20 PROCEDURE — 88307 TISSUE EXAM BY PATHOLOGIST: CPT | Performed by: OBSTETRICS & GYNECOLOGY

## 2017-01-20 PROCEDURE — 85025 COMPLETE CBC W/AUTO DIFF WBC: CPT | Performed by: OBSTETRICS & GYNECOLOGY

## 2017-01-20 RX ORDER — HYDROCODONE BITARTRATE AND ACETAMINOPHEN 5; 325 MG/1; MG/1
1 TABLET ORAL EVERY 4 HOURS PRN
Status: DISCONTINUED | OUTPATIENT
Start: 2017-01-20 | End: 2017-01-20

## 2017-01-20 RX ORDER — IBUPROFEN 800 MG/1
800 TABLET ORAL EVERY 8 HOURS SCHEDULED
Status: DISCONTINUED | OUTPATIENT
Start: 2017-01-20 | End: 2017-01-21 | Stop reason: HOSPADM

## 2017-01-20 RX ORDER — HYDROCODONE BITARTRATE AND ACETAMINOPHEN 5; 325 MG/1; MG/1
TABLET ORAL
Status: COMPLETED
Start: 2017-01-20 | End: 2017-01-20

## 2017-01-20 RX ORDER — PRENATAL VIT/IRON FUM/FOLIC AC 27MG-0.8MG
1 TABLET ORAL DAILY
Status: DISCONTINUED | OUTPATIENT
Start: 2017-01-20 | End: 2017-01-21 | Stop reason: HOSPADM

## 2017-01-20 RX ORDER — HYDROCODONE BITARTRATE AND ACETAMINOPHEN 5; 325 MG/1; MG/1
1 TABLET ORAL EVERY 4 HOURS PRN
Status: DISCONTINUED | OUTPATIENT
Start: 2017-01-20 | End: 2017-01-21 | Stop reason: HOSPADM

## 2017-01-20 RX ADMIN — HYDROCODONE BITARTRATE AND ACETAMINOPHEN 1 TABLET: 5; 325 TABLET ORAL at 17:46

## 2017-01-20 RX ADMIN — HYDROCODONE BITARTRATE AND ACETAMINOPHEN 1 TABLET: 5; 325 TABLET ORAL at 10:15

## 2017-01-20 RX ADMIN — HYDROCODONE BITARTRATE AND ACETAMINOPHEN 1 TABLET: 5; 325 TABLET ORAL at 21:49

## 2017-01-20 RX ADMIN — DOCUSATE SODIUM 100 MG: 100 CAPSULE, LIQUID FILLED ORAL at 10:14

## 2017-01-20 RX ADMIN — HYDROCODONE BITARTRATE AND ACETAMINOPHEN 1 TABLET: 5; 325 TABLET ORAL at 00:19

## 2017-01-20 RX ADMIN — IBUPROFEN 800 MG: 800 TABLET ORAL at 06:10

## 2017-01-20 RX ADMIN — IBUPROFEN 800 MG: 800 TABLET ORAL at 21:49

## 2017-01-20 RX ADMIN — IBUPROFEN 800 MG: 800 TABLET ORAL at 15:00

## 2017-01-20 RX ADMIN — PRENATAL VIT W/ FE FUMARATE-FA TAB 27-0.8 MG 1 TABLET: 27-0.8 TAB at 10:14

## 2017-01-20 NOTE — PROGRESS NOTES
"Case Management/Social Work    Patient Name:  Florencia Vitale  YOB: 1995  MRN: [id number]  Admit Date:  1/19/2017    SS received consult maternal drug history. Pt is 20 Y/O Florencia Vitale who delivered viable baby boy on 1/19/17 weighing 4 lbs 11 oz and 18 inches long. Infant was named Rossana Vitale \"Moshe.\" FOB is Nathen Mesa who is involved. Pt has one other child and FOB has one other child, Mireille Vitale, age 1 and Devan Mesa, age 1. Pt and FOB state they have custody of both children. Pt lives at [address] [place] [address]. Pt lives in the home with FOB and other children. Pt utilizes WIC and SNAP benefits. Pt does not utilize the HANDS Program. Pt has [employer]. Pt to sign infant up on Medicaid. Infant care supplies available including the car seat.     Pt's UDS results were positive for THC upon admission. Infant's UDS results were negative upon admission. Pt had positive UDS results for THC throughout pregnancy. Pt admits to smoking THC. Pt states the last time she smoked was October. Pt states she smoked THC throughout pregnancy because she did not have an appetite.     Pt has an open case with [facility] per Harper Mcintyre for living conditions. SS contacted Central Intake and report was accepted for investigation. Intake ID# [id number]. [facility] SS to provide infant's discharged plan when available.     FOB to provide transportation at discharge.    SS to be contacted with positive CordStat results or any other issues or concerns.     Electronically signed by:  Melonie Carlos  01/20/17 12:13 PM  "

## 2017-01-20 NOTE — PROGRESS NOTES
" [facility]  Vaginal Delivery Progress Note    Subjective   Postpartum Day 1: Vaginal Delivery    The patient feels well.  Denies complaints.  Lochia is light.  Did notice cervix coming past vagina with Bm last night.  Discussed that this will likely reduce without issue.     Objective     Vital Signs Range for the last 24 hours  Temperature: Temp:  [96.6 °F (35.9 °C)-98.6 °F (37 °C)] 98.6 °F (37 °C)   Temp Source: Temp src: Oral   BP: BP: (112-129)/(56-81) 119/66   Pulse: Heart Rate:  [67-90] 67   Respirations: Resp:  [18] 18   SPO2: SpO2:  [98 %] 98 %   O2 Amount (l/min):     O2 Devices O2 Device: room air   Weight: Weight:  [105 lb (47.6 kg)] 105 lb (47.6 kg)     Admit Height:  Height: 63\" (160 cm)      Physical Exam:  General:  no acute distresss.  Abdomen: Fundus: appropriate, firm, non tender  Extremities: normal, atraumatic, no cyanosis, and trace edema.       Lab results reviewed:  Yes       Assessment/Plan     Normal postpartum state      Plan:  Continue current care.      Leon Cooney,   1/20/2017  8:38 AM  "

## 2017-01-20 NOTE — OP NOTE
[facility]  Vaginal Delivery Note    Delivery     Delivery: Vaginal, Spontaneous Delivery     YOB: 2016    Time of Birth: 9:18 PM      Anesthesia: Epidural     Delivering clinician: Leon Cooney    Forceps?   No   Vacuum? No    Shoulder dystocia present: No        Delivery narrative:      Infant    Findings: male  infant     Infant observations: Weight: 4 lb 11.3 oz (2.134 kg)   Length: 18.504  in  Observations/Comments:         Apgars: 9   @ 1 minute /    9   @ 5 minutes   Infant Name:      Placenta, Cord, and Fluid    Placenta delivered  Spontaneous  at  1/19  9:22 PM     Cord: 3 vessels  present.   Nuchal Cord?  no   Cord blood obtained: Yes                   Repair    Episiotomy: None    Lacerations: No   Estimated Blood Loss: 300  mls.   Suture used for repair:      Complications  none    Disposition  Mother to postpartum in stable condition.    Leon Cooney DO  01/20/17  8:40 AM

## 2017-01-20 NOTE — PLAN OF CARE
Problem: Patient Care Overview (Adult)  Goal: Discharge Needs Assessment  Outcome: Ongoing (interventions implemented as appropriate)  Case Management/Social Work     Patient Name:  Florencia Vitale  YOB: 1995  MRN: [id number]  Admit Date:  1/19/2017     [facility] to provide infant's discharge plan when available.      Electronically signed by:  Melonie Carlos  01/20/17 1:19 PM

## 2017-01-20 NOTE — PLAN OF CARE
Problem: Patient Care Overview (Adult)  Goal: Plan of Care Review  Outcome: Ongoing (interventions implemented as appropriate)    01/20/17 0121   Coping/Psychosocial Response Interventions   Plan Of Care Reviewed With patient   Patient Care Overview   Progress progress toward functional goals as expected       Goal: Adult Individualization and Mutuality  Outcome: Ongoing (interventions implemented as appropriate)  Goal: Discharge Needs Assessment  Outcome: Ongoing (interventions implemented as appropriate)    Problem: Postpartum, Vaginal Delivery (Adult)  Goal: Signs and Symptoms of Listed Potential Problems Will be Absent or Manageable (Postpartum, Vaginal Delivery)  Outcome: Ongoing (interventions implemented as appropriate)

## 2017-01-20 NOTE — PROGRESS NOTES
Case Management/Social Work    Patient Name:  Florencia Vitale  YOB: 1995  MRN: [id number]  Admit Date:  1/19/2017    [facility]. [facility] to provide infant's discharge plan when available.       Electronically signed by:  Melonie Carlos  01/20/17 3:43 PM

## 2017-01-20 NOTE — PLAN OF CARE
Problem: Labor (Cervical Ripen, Induct, Augment) (Adult,Obstetrics,Pediatric)  Goal: Signs and Symptoms of Listed Potential Problems Will be Absent or Manageable (Labor)  Outcome: Ongoing (interventions implemented as appropriate)

## 2017-01-21 VITALS
RESPIRATION RATE: 16 BRPM | WEIGHT: 105 LBS | BODY MASS INDEX: 18.61 KG/M2 | SYSTOLIC BLOOD PRESSURE: 115 MMHG | TEMPERATURE: 98 F | HEIGHT: 63 IN | HEART RATE: 80 BPM | OXYGEN SATURATION: 98 % | DIASTOLIC BLOOD PRESSURE: 81 MMHG

## 2017-01-21 PROBLEM — O47.00 PRETERM CONTRACTIONS: Status: RESOLVED | Noted: 2017-01-19 | Resolved: 2017-01-21

## 2017-01-21 PROBLEM — Z34.90 PREGNANCY: Status: RESOLVED | Noted: 2017-01-19 | Resolved: 2017-01-21

## 2017-01-21 PROBLEM — Z34.90 PREGNANT: Status: RESOLVED | Noted: 2017-01-18 | Resolved: 2017-01-21

## 2017-01-21 RX ORDER — OXYCODONE AND ACETAMINOPHEN 10; 325 MG/1; MG/1
1 TABLET ORAL EVERY 4 HOURS PRN
Status: DISCONTINUED | OUTPATIENT
Start: 2017-01-21 | End: 2017-01-21 | Stop reason: HOSPADM

## 2017-01-21 RX ORDER — IBUPROFEN 800 MG/1
800 TABLET ORAL EVERY 8 HOURS SCHEDULED
Qty: 40 TABLET | Refills: 0 | Status: SHIPPED | OUTPATIENT
Start: 2017-01-21

## 2017-01-21 RX ADMIN — OXYCODONE HYDROCHLORIDE AND ACETAMINOPHEN 1 TABLET: 10; 325 TABLET ORAL at 07:58

## 2017-01-21 RX ADMIN — OXYCODONE HYDROCHLORIDE AND ACETAMINOPHEN 1 TABLET: 10; 325 TABLET ORAL at 18:01

## 2017-01-21 RX ADMIN — OXYCODONE HYDROCHLORIDE AND ACETAMINOPHEN 1 TABLET: 10; 325 TABLET ORAL at 10:58

## 2017-01-21 RX ADMIN — OXYCODONE HYDROCHLORIDE AND ACETAMINOPHEN 1 TABLET: 10; 325 TABLET ORAL at 14:13

## 2017-01-21 RX ADMIN — HYDROCODONE BITARTRATE AND ACETAMINOPHEN 1 TABLET: 5; 325 TABLET ORAL at 15:50

## 2017-01-21 RX ADMIN — PRENATAL VIT W/ FE FUMARATE-FA TAB 27-0.8 MG 1 TABLET: 27-0.8 TAB at 10:58

## 2017-01-21 RX ADMIN — IBUPROFEN 800 MG: 800 TABLET ORAL at 14:12

## 2017-01-21 RX ADMIN — DOCUSATE SODIUM 100 MG: 100 CAPSULE, LIQUID FILLED ORAL at 10:58

## 2017-01-21 NOTE — PLAN OF CARE
Problem: Patient Care Overview (Adult)  Goal: Plan of Care Review  Outcome: Ongoing (interventions implemented as appropriate)    01/20/17 0121 01/20/17 1930   Coping/Psychosocial Response Interventions   Plan Of Care Reviewed With --  patient   Patient Care Overview   Progress progress toward functional goals as expected --        Goal: Adult Individualization and Mutuality  Outcome: Ongoing (interventions implemented as appropriate)  Goal: Discharge Needs Assessment  Outcome: Ongoing (interventions implemented as appropriate)    Problem: Postpartum, Vaginal Delivery (Adult)  Goal: Signs and Symptoms of Listed Potential Problems Will be Absent or Manageable (Postpartum, Vaginal Delivery)  Outcome: Ongoing (interventions implemented as appropriate)

## 2017-01-21 NOTE — DISCHARGE SUMMARY
Discharge Summary: Vaginal Delivery     Admit Date: 2017  5:13 PM    Admit Diagnosis:  contractions [O47.9]  Pregnancy [Z33.1]    Date of Discharge:  2017    Discharge Diagnosis: Same        Hospital Course  Patient is a 21 y.o. female, G2, now P 2. S/P . PPD#2. Patient doing well. No complaints. Patient tolerating a regular diet and ambulating without difficulty. Will discharge to home today.     Procedures Performed  Normal Spontaneous Vaginal Delivery         Vital Signs  Temp:  [97.6 °F (36.4 °C)] 97.6 °F (36.4 °C)  Heart Rate:  [86] 86  Resp:  [18] 18    Review of Systems    The following systems were reviewed and negative;  ENT, respiratory, cardiovascular, gastrointestinal, genitourinary, breast, endocrine and allergies / immunologic.      Physical Exam:      General Appearance:    Alert, cooperative, in no acute distress   Head:    Normocephalic, without obvious abnormality, atraumatic   Eyes:            Lids and lashes normal, conjunctivae and sclerae normal, no   icterus, no pallor, corneas clear, PERRLA   Ears:    Ears appear intact with no abnormalities noted   Throat:   No oral lesions, no thrush, oral mucosa moist   Neck:   No adenopathy, supple, trachea midline, no thyromegaly, no     carotid bruit, no JVD   Back:     No kyphosis present, no scoliosis present, no skin lesions,       erythema or scars, no tenderness to percussion or                   palpation,   range of motion normal   Lungs:     Clear to auscultation,respirations regular, even and                   unlabored    Heart:    Regular rhythm and normal rate, normal S1 and S2, no            murmur, no gallop, no rub, no click   Breast Exam:    Deferred   Abdomen:     Normal bowel sounds, no masses, no organomegaly, soft        non-tender, non-distended, no guarding, no rebound                 tenderness   Genitalia:    Deferred   Extremities:   Moves all extremities well, no edema, no cyanosis, no              redness    Pulses:   Pulses palpable and equal bilaterally   Skin:   No bleeding, bruising or rash   Lymph nodes:   No palpable adenopathy   Neurologic:   Cranial nerves 2 - 12 grossly intact, sensation intact, DTR        present and equal bilaterally             Condition on Discharge:  Stable    Urine Output Good    Discharge Diet: Regular    Discharge Medications  Motrin 800 mg q 6 #30    Activity at Discharge: No driving x 2 weeks. Nothing per vagina until cleared by a physician. Patient expected to return to work or school in 6 weeks.     Follow-up Appointments  Patient will follow up with Dr. Cooney in 2 weeks.        Nando Pandey MD.   01/21/17  9:28 AM

## 2017-01-24 LAB
LAB AP CASE REPORT: NORMAL
Lab: NORMAL
PATH REPORT.FINAL DX SPEC: NORMAL

## 2017-05-26 ENCOUNTER — HOSPITAL ENCOUNTER (EMERGENCY)
Facility: HOSPITAL | Age: 22
Discharge: HOME | End: 2017-05-26
Attending: STUDENT IN AN ORGANIZED HEALTH CARE EDUCATION/TRAINING PROGRAM | Admitting: STUDENT IN AN ORGANIZED HEALTH CARE EDUCATION/TRAINING PROGRAM

## 2017-05-26 VITALS
SYSTOLIC BLOOD PRESSURE: 122 MMHG | RESPIRATION RATE: 18 BRPM | HEIGHT: 63 IN | TEMPERATURE: 97.5 F | BODY MASS INDEX: 17.72 KG/M2 | OXYGEN SATURATION: 100 % | DIASTOLIC BLOOD PRESSURE: 69 MMHG | WEIGHT: 100 LBS | HEART RATE: 62 BPM

## 2017-05-26 DIAGNOSIS — R10.13 ABDOMINAL PAIN, ACUTE, EPIGASTRIC: Primary | ICD-10-CM

## 2017-05-26 LAB
ALBUMIN SERPL-MCNC: 4.1 G/DL (ref 3.5–5)
ALBUMIN/GLOB SERPL: 1.2 G/DL (ref 1–2)
ALP SERPL-CCNC: 80 U/L (ref 38–126)
ALT SERPL W P-5'-P-CCNC: 25 U/L (ref 13–69)
ANION GAP SERPL CALCULATED.3IONS-SCNC: 14.5 MMOL/L
AST SERPL-CCNC: 26 U/L (ref 15–46)
B-HCG UR QL: NEGATIVE
BACTERIA UR QL AUTO: ABNORMAL /HPF
BASOPHILS # BLD AUTO: 0.05 10*3/MM3 (ref 0–0.2)
BASOPHILS NFR BLD AUTO: 0.7 % (ref 0–2.5)
BILIRUB SERPL-MCNC: 0.6 MG/DL (ref 0.2–1.3)
BILIRUB UR QL STRIP: NEGATIVE
BUN BLD-MCNC: 8 MG/DL (ref 7–20)
BUN/CREAT SERPL: 11.4 (ref 7.1–23.5)
CALCIUM SPEC-SCNC: 9 MG/DL (ref 8.4–10.2)
CHLORIDE SERPL-SCNC: 106 MMOL/L (ref 98–107)
CLARITY UR: CLEAR
CO2 SERPL-SCNC: 26 MMOL/L (ref 26–30)
COLOR UR: YELLOW
CREAT BLD-MCNC: 0.7 MG/DL (ref 0.6–1.3)
DEPRECATED RDW RBC AUTO: 45.6 FL (ref 37–54)
EOSINOPHIL # BLD AUTO: 0.31 10*3/MM3 (ref 0–0.7)
EOSINOPHIL NFR BLD AUTO: 4.4 % (ref 0–7)
ERYTHROCYTE [DISTWIDTH] IN BLOOD BY AUTOMATED COUNT: 13.5 % (ref 11.5–14.5)
GFR SERPL CREATININE-BSD FRML MDRD: 107 ML/MIN/1.73
GLOBULIN UR ELPH-MCNC: 3.4 GM/DL
GLUCOSE BLD-MCNC: 105 MG/DL (ref 74–98)
GLUCOSE UR STRIP-MCNC: NEGATIVE MG/DL
HCT VFR BLD AUTO: 36.8 % (ref 37–47)
HGB BLD-MCNC: 12.1 G/DL (ref 12–16)
HGB UR QL STRIP.AUTO: NEGATIVE
HYALINE CASTS UR QL AUTO: ABNORMAL /LPF
IMM GRANULOCYTES # BLD: 0.03 10*3/MM3 (ref 0–0.06)
IMM GRANULOCYTES NFR BLD: 0.4 % (ref 0–0.6)
KETONES UR QL STRIP: NEGATIVE
LEUKOCYTE ESTERASE UR QL STRIP.AUTO: ABNORMAL
LIPASE SERPL-CCNC: 56 U/L (ref 23–300)
LYMPHOCYTES # BLD AUTO: 1.58 10*3/MM3 (ref 0.6–3.4)
LYMPHOCYTES NFR BLD AUTO: 22.5 % (ref 10–50)
MCH RBC QN AUTO: 30 PG (ref 27–31)
MCHC RBC AUTO-ENTMCNC: 32.9 G/DL (ref 30–37)
MCV RBC AUTO: 91.3 FL (ref 81–99)
MONOCYTES # BLD AUTO: 0.52 10*3/MM3 (ref 0–0.9)
MONOCYTES NFR BLD AUTO: 7.4 % (ref 0–12)
MUCOUS THREADS URNS QL MICRO: ABNORMAL /HPF
NEUTROPHILS # BLD AUTO: 4.54 10*3/MM3 (ref 2–6.9)
NEUTROPHILS NFR BLD AUTO: 64.6 % (ref 37–80)
NITRITE UR QL STRIP: NEGATIVE
NRBC BLD MANUAL-RTO: 0 /100 WBC (ref 0–0)
PH UR STRIP.AUTO: 7 [PH] (ref 5–8)
PLATELET # BLD AUTO: 207 10*3/MM3 (ref 130–400)
PMV BLD AUTO: 11.1 FL (ref 6–12)
POTASSIUM BLD-SCNC: 3.5 MMOL/L (ref 3.5–5.1)
PROT SERPL-MCNC: 7.5 G/DL (ref 6.3–8.2)
PROT UR QL STRIP: NEGATIVE
RBC # BLD AUTO: 4.03 10*6/MM3 (ref 4.2–5.4)
RBC # UR: ABNORMAL /HPF
REF LAB TEST METHOD: ABNORMAL
SODIUM BLD-SCNC: 143 MMOL/L (ref 137–145)
SP GR UR STRIP: 1.01 (ref 1–1.03)
SQUAMOUS #/AREA URNS HPF: ABNORMAL /HPF
UROBILINOGEN UR QL STRIP: ABNORMAL
WBC NRBC COR # BLD: 7.03 10*3/MM3 (ref 4.8–10.8)
WBC UR QL AUTO: ABNORMAL /HPF

## 2017-05-26 PROCEDURE — 99283 EMERGENCY DEPT VISIT LOW MDM: CPT

## 2017-05-26 PROCEDURE — 81001 URINALYSIS AUTO W/SCOPE: CPT | Performed by: PHYSICIAN ASSISTANT

## 2017-05-26 PROCEDURE — 80053 COMPREHEN METABOLIC PANEL: CPT | Performed by: PHYSICIAN ASSISTANT

## 2017-05-26 PROCEDURE — 85025 COMPLETE CBC W/AUTO DIFF WBC: CPT | Performed by: PHYSICIAN ASSISTANT

## 2017-05-26 PROCEDURE — 81025 URINE PREGNANCY TEST: CPT | Performed by: PHYSICIAN ASSISTANT

## 2017-05-26 PROCEDURE — 83690 ASSAY OF LIPASE: CPT | Performed by: PHYSICIAN ASSISTANT

## 2017-05-26 RX ORDER — ALUMINA, MAGNESIA, AND SIMETHICONE 2400; 2400; 240 MG/30ML; MG/30ML; MG/30ML
10 SUSPENSION ORAL ONCE
Status: COMPLETED | OUTPATIENT
Start: 2017-05-26 | End: 2017-05-26

## 2017-05-26 RX ORDER — ONDANSETRON 4 MG/1
4 TABLET, ORALLY DISINTEGRATING ORAL EVERY 6 HOURS PRN
Qty: 10 TABLET | Refills: 0 | Status: SHIPPED | OUTPATIENT
Start: 2017-05-26

## 2017-05-26 RX ORDER — OMEPRAZOLE 20 MG/1
20 CAPSULE, DELAYED RELEASE ORAL DAILY
Qty: 30 CAPSULE | Refills: 0 | Status: SHIPPED | OUTPATIENT
Start: 2017-05-26

## 2017-05-26 RX ORDER — PANTOPRAZOLE SODIUM 40 MG/1
40 TABLET, DELAYED RELEASE ORAL ONCE
Status: COMPLETED | OUTPATIENT
Start: 2017-05-26 | End: 2017-05-26

## 2017-05-26 RX ORDER — SODIUM CHLORIDE 0.9 % (FLUSH) 0.9 %
10 SYRINGE (ML) INJECTION AS NEEDED
Status: DISCONTINUED | OUTPATIENT
Start: 2017-05-26 | End: 2017-05-26

## 2017-05-26 RX ADMIN — PANTOPRAZOLE SODIUM 40 MG: 40 TABLET, DELAYED RELEASE ORAL at 18:22

## 2017-05-26 RX ADMIN — ALUMINUM HYDROXIDE, MAGNESIUM HYDROXIDE, AND DIMETHICONE 10 ML: 400; 400; 40 SUSPENSION ORAL at 18:29

## 2018-01-14 ENCOUNTER — HOSPITAL ENCOUNTER (EMERGENCY)
Facility: HOSPITAL | Age: 23
Discharge: HOME | End: 2018-01-15
Attending: EMERGENCY MEDICINE | Admitting: EMERGENCY MEDICINE

## 2018-01-14 DIAGNOSIS — R10.9 ABDOMINAL PAIN, UNSPECIFIED ABDOMINAL LOCATION: ICD-10-CM

## 2018-01-14 DIAGNOSIS — Z34.90 PREGNANCY, UNSPECIFIED GESTATIONAL AGE: Primary | ICD-10-CM

## 2018-01-14 PROCEDURE — 36415 COLL VENOUS BLD VENIPUNCTURE: CPT

## 2018-01-14 PROCEDURE — 84702 CHORIONIC GONADOTROPIN TEST: CPT | Performed by: EMERGENCY MEDICINE

## 2018-01-14 PROCEDURE — 80053 COMPREHEN METABOLIC PANEL: CPT | Performed by: PHYSICIAN ASSISTANT

## 2018-01-14 PROCEDURE — 85025 COMPLETE CBC W/AUTO DIFF WBC: CPT | Performed by: PHYSICIAN ASSISTANT

## 2018-01-14 PROCEDURE — 81003 URINALYSIS AUTO W/O SCOPE: CPT | Performed by: PHYSICIAN ASSISTANT

## 2018-01-14 PROCEDURE — 99283 EMERGENCY DEPT VISIT LOW MDM: CPT

## 2018-01-14 PROCEDURE — 84703 CHORIONIC GONADOTROPIN ASSAY: CPT | Performed by: PHYSICIAN ASSISTANT

## 2018-01-14 PROCEDURE — 83690 ASSAY OF LIPASE: CPT | Performed by: PHYSICIAN ASSISTANT

## 2018-01-15 ENCOUNTER — APPOINTMENT (OUTPATIENT)
Dept: ULTRASOUND IMAGING | Facility: HOSPITAL | Age: 23
End: 2018-01-15

## 2018-01-15 VITALS
WEIGHT: 94 LBS | HEART RATE: 92 BPM | RESPIRATION RATE: 16 BRPM | BODY MASS INDEX: 16.66 KG/M2 | HEIGHT: 63 IN | DIASTOLIC BLOOD PRESSURE: 71 MMHG | OXYGEN SATURATION: 100 % | TEMPERATURE: 98 F | SYSTOLIC BLOOD PRESSURE: 127 MMHG

## 2018-01-15 LAB
ALBUMIN SERPL-MCNC: 4.5 G/DL (ref 3.5–5)
ALBUMIN/GLOB SERPL: 1.4 G/DL (ref 1–2)
ALP SERPL-CCNC: 74 U/L (ref 38–126)
ALT SERPL W P-5'-P-CCNC: 35 U/L (ref 13–69)
ANION GAP SERPL CALCULATED.3IONS-SCNC: 12.6 MMOL/L
AST SERPL-CCNC: 23 U/L (ref 15–46)
BASOPHILS # BLD AUTO: 0.06 10*3/MM3 (ref 0–0.2)
BASOPHILS NFR BLD AUTO: 0.6 % (ref 0–2.5)
BILIRUB SERPL-MCNC: 0.2 MG/DL (ref 0.2–1.3)
BILIRUB UR QL STRIP: NEGATIVE
BUN BLD-MCNC: 9 MG/DL (ref 7–20)
BUN/CREAT SERPL: 15 (ref 7.1–23.5)
CALCIUM SPEC-SCNC: 9.1 MG/DL (ref 8.4–10.2)
CHLORIDE SERPL-SCNC: 104 MMOL/L (ref 98–107)
CLARITY UR: CLEAR
CO2 SERPL-SCNC: 25 MMOL/L (ref 26–30)
COLOR UR: NORMAL
CREAT BLD-MCNC: 0.6 MG/DL (ref 0.6–1.3)
DEPRECATED RDW RBC AUTO: 46.2 FL (ref 37–54)
EOSINOPHIL # BLD AUTO: 0.07 10*3/MM3 (ref 0–0.7)
EOSINOPHIL NFR BLD AUTO: 0.8 % (ref 0–7)
ERYTHROCYTE [DISTWIDTH] IN BLOOD BY AUTOMATED COUNT: 13.5 % (ref 11.5–14.5)
GFR SERPL CREATININE-BSD FRML MDRD: 126 ML/MIN/1.73
GLOBULIN UR ELPH-MCNC: 3.3 GM/DL
GLUCOSE BLD-MCNC: 95 MG/DL (ref 74–98)
GLUCOSE UR STRIP-MCNC: NEGATIVE MG/DL
HCG INTACT+B SERPL-ACNC: NORMAL MIU/ML
HCG SERPL QL: POSITIVE
HCT VFR BLD AUTO: 35 % (ref 37–47)
HGB BLD-MCNC: 11.6 G/DL (ref 12–16)
HGB UR QL STRIP.AUTO: NEGATIVE
IMM GRANULOCYTES # BLD: 0.03 10*3/MM3 (ref 0–0.06)
IMM GRANULOCYTES NFR BLD: 0.3 % (ref 0–0.6)
KETONES UR QL STRIP: NEGATIVE
LEUKOCYTE ESTERASE UR QL STRIP.AUTO: NEGATIVE
LIPASE SERPL-CCNC: 67 U/L (ref 23–300)
LYMPHOCYTES # BLD AUTO: 1.87 10*3/MM3 (ref 0.6–3.4)
LYMPHOCYTES NFR BLD AUTO: 20 % (ref 10–50)
MCH RBC QN AUTO: 30.9 PG (ref 27–31)
MCHC RBC AUTO-ENTMCNC: 33.1 G/DL (ref 30–37)
MCV RBC AUTO: 93.3 FL (ref 81–99)
MONOCYTES # BLD AUTO: 0.6 10*3/MM3 (ref 0–0.9)
MONOCYTES NFR BLD AUTO: 6.4 % (ref 0–12)
NEUTROPHILS # BLD AUTO: 6.7 10*3/MM3 (ref 2–6.9)
NEUTROPHILS NFR BLD AUTO: 71.9 % (ref 37–80)
NITRITE UR QL STRIP: NEGATIVE
NRBC BLD MANUAL-RTO: 0 /100 WBC (ref 0–0)
PH UR STRIP.AUTO: 6 [PH] (ref 5–8)
PLATELET # BLD AUTO: 223 10*3/MM3 (ref 130–400)
PMV BLD AUTO: 11.2 FL (ref 6–12)
POTASSIUM BLD-SCNC: 3.6 MMOL/L (ref 3.5–5.1)
PROT SERPL-MCNC: 7.8 G/DL (ref 6.3–8.2)
PROT UR QL STRIP: NEGATIVE
RBC # BLD AUTO: 3.75 10*6/MM3 (ref 4.2–5.4)
SODIUM BLD-SCNC: 138 MMOL/L (ref 137–145)
SP GR UR STRIP: <=1.005 (ref 1–1.03)
UROBILINOGEN UR QL STRIP: NORMAL
WBC NRBC COR # BLD: 9.33 10*3/MM3 (ref 4.8–10.8)

## 2018-01-15 PROCEDURE — 76801 OB US < 14 WKS SINGLE FETUS: CPT

## 2018-01-15 NOTE — ED PROVIDER NOTES
Subjective   HPI Comments: 22-year-old female that is G2, P2, A0.  He is here with mild-to-moderate diffuse abdominal discomfort radiating to her lower back for 2 weeks, progressively worsening.  Some nausea without vomiting or diarrhea.  No urinary complaint.  Normal bowel movement earlier today.  States she had one positive and one negative pregnancy test at home.  LMP 12/7/17.  I have reviewed her old records.  She is O+ blood type.    Aggravating factors: Palpation of the abdomen.  Alleviating factors: None.  Treatment prior to arrival: Positive and negative pregnancy test.      History provided by:  Patient  History limited by: nothing.   used: No        Review of Systems   Constitutional: Negative.    HENT: Negative.    Eyes: Negative.    Respiratory: Negative.    Cardiovascular: Negative.  Negative for chest pain.   Gastrointestinal: Positive for abdominal pain and nausea.   Endocrine: Negative.    Genitourinary: Negative for dysuria.   Musculoskeletal: Positive for back pain.   Skin: Negative.    Allergic/Immunologic: Negative.    Neurological: Negative.    Hematological: Negative.    Psychiatric/Behavioral: Negative.    All other systems reviewed and are negative.      Past Medical History:   Diagnosis Date   • Asthma        No Known Allergies    History reviewed. No pertinent surgical history.    Family History   Problem Relation Age of Onset   • Kidney disease Father    • Heart disease Father    • Lung disease Mother    • Seizures Mother    • Autism spectrum disorder Son    • Lung disease Paternal Grandmother    • Heart disease Paternal Grandmother    • Heart disease Maternal Grandmother    • Lung disease Maternal Grandmother    • Lung disease Maternal Grandfather    • Heart disease Maternal Grandfather        Social History     Social History   • Marital status:      Spouse name: N/A   • Number of children: N/A   • Years of education: N/A     Social History Main Topics   •  Smoking status: Current Every Day Smoker     Packs/day: 0.50     Types: Cigarettes   • Smokeless tobacco: Never Used   • Alcohol use No   • Drug use: No      Comment: STOPPED WHEN FOUND OUT PREGNANT   • Sexual activity: Yes     Partners: Female, Male     Other Topics Concern   • None     Social History Narrative   • None           Objective   Physical Exam   Constitutional: She is oriented to person, place, and time. She appears well-developed and well-nourished. No distress.   HENT:   Head: Normocephalic and atraumatic.   Right Ear: External ear normal.   Left Ear: External ear normal.   Eyes: EOM are normal. Pupils are equal, round, and reactive to light.   Neck: Normal range of motion. Neck supple.   Cardiovascular: Normal rate, regular rhythm and normal heart sounds.    Pulmonary/Chest: Effort normal and breath sounds normal. No stridor. She has no wheezes. She exhibits no tenderness.   Abdominal: Soft. She exhibits no distension. There is no rebound and no guarding.   Mild diffuse tenderness.  No rebound or guarding.   Musculoskeletal: Normal range of motion. She exhibits no edema.   Neurological: She is alert and oriented to person, place, and time.   Skin: Skin is warm and dry. No rash noted. She is not diaphoretic.   Psychiatric: She has a normal mood and affect. Her behavior is normal. Judgment and thought content normal.   Nursing note and vitals reviewed.      Procedures         ED Course  ED Course                  MDM    Final diagnoses:   None       Patient was checked out to me from the physician's assistant.  At that time patient awaiting laboratory tests for further evaluation.  Patient's serum pregnancy screen was positive.  An ultrasound was added on.  This revealed an intrauterine gestation estimated at 7 weeks and 5 days.  On my exam patient's abdomen was soft and minimally uncomfortable.  We will refer patient to OB for further evaluation.     Ashley Espinoza MD  01/15/18 3104

## 2018-02-04 ENCOUNTER — HOSPITAL ENCOUNTER (EMERGENCY)
Facility: HOSPITAL | Age: 23
Discharge: HOME | End: 2018-02-04
Attending: EMERGENCY MEDICINE | Admitting: EMERGENCY MEDICINE

## 2018-02-04 VITALS
DIASTOLIC BLOOD PRESSURE: 75 MMHG | SYSTOLIC BLOOD PRESSURE: 107 MMHG | WEIGHT: 93 LBS | BODY MASS INDEX: 16.48 KG/M2 | HEART RATE: 109 BPM | TEMPERATURE: 98.2 F | HEIGHT: 63 IN | RESPIRATION RATE: 15 BRPM | OXYGEN SATURATION: 100 %

## 2018-02-04 DIAGNOSIS — J40 BRONCHITIS: Primary | ICD-10-CM

## 2018-02-04 LAB
FLUAV AG NPH QL: NEGATIVE
FLUBV AG NPH QL IA: NEGATIVE
S PYO AG THROAT QL: NEGATIVE

## 2018-02-04 PROCEDURE — 87880 STREP A ASSAY W/OPTIC: CPT

## 2018-02-04 PROCEDURE — 87804 INFLUENZA ASSAY W/OPTIC: CPT

## 2018-02-04 PROCEDURE — 99283 EMERGENCY DEPT VISIT LOW MDM: CPT

## 2018-02-04 PROCEDURE — 87081 CULTURE SCREEN ONLY: CPT

## 2018-02-04 RX ORDER — AMOXICILLIN 500 MG/1
500 CAPSULE ORAL 3 TIMES DAILY
Qty: 30 CAPSULE | Refills: 0 | Status: SHIPPED | OUTPATIENT
Start: 2018-02-04

## 2018-02-04 RX ORDER — ALBUTEROL SULFATE 90 UG/1
2 AEROSOL, METERED RESPIRATORY (INHALATION) EVERY 6 HOURS PRN
Qty: 1 INHALER | Refills: 0 | Status: SHIPPED | OUTPATIENT
Start: 2018-02-04

## 2018-02-04 NOTE — DISCHARGE INSTRUCTIONS
Acute Bronchitis, Adult  Acute bronchitis is when air tubes (bronchi) in the lungs suddenly get swollen. The condition can make it hard to breathe. It can also cause these symptoms:  · A cough.  · Coughing up clear, yellow, or green mucus.  · Wheezing.  · Chest congestion.  · Shortness of breath.  · A fever.  · Body aches.  · Chills.  · A sore throat.  Follow these instructions at home:  Medicines  · Take over-the-counter and prescription medicines only as told by your doctor.  · If you were prescribed an antibiotic medicine, take it as told by your doctor. Do not stop taking the antibiotic even if you start to feel better.  General instructions  · Rest.  · Drink enough fluids to keep your pee (urine) clear or pale yellow.  · Avoid smoking and secondhand smoke. If you smoke and you need help quitting, ask your doctor. Quitting will help your lungs heal faster.  · Use an inhaler, cool mist vaporizer, or humidifier as told by your doctor.  · Keep all follow-up visits as told by your doctor. This is important.  How is this prevented?  To lower your risk of getting this condition again:  · Wash your hands often with soap and water. If you cannot use soap and water, use hand .  · Avoid contact with people who have cold symptoms.  · Try not to touch your hands to your mouth, nose, or eyes.  · Make sure to get the flu shot every year.  Contact a doctor if:  · Your symptoms do not get better in 2 weeks.  Get help right away if:  · You cough up blood.  · You have chest pain.  · You have very bad shortness of breath.  · You become dehydrated.  · You faint (pass out) or keep feeling like you are going to pass out.  · You keep throwing up (vomiting).  · You have a very bad headache.  · Your fever or chills gets worse.  This information is not intended to replace advice given to you by your health care provider. Make sure you discuss any questions you have with your health care provider.  Document Released: 06/05/2009  Document Revised: 07/26/2017 Document Reviewed: 06/07/2017  [employer] Patient Education © 2017 [employer]    Contact your primary care provider or OB/GYN if symptoms are not improving, return to the ER if they worsen. Drink plenty of water. Stop smoking.

## 2018-02-06 LAB — BACTERIA SPEC AEROBE CULT: NORMAL

## 2018-02-07 ENCOUNTER — HOSPITAL ENCOUNTER (EMERGENCY)
Facility: HOSPITAL | Age: 23
Discharge: HOME | End: 2018-02-08
Attending: EMERGENCY MEDICINE | Admitting: EMERGENCY MEDICINE

## 2018-02-07 ENCOUNTER — APPOINTMENT (OUTPATIENT)
Dept: ULTRASOUND IMAGING | Facility: HOSPITAL | Age: 23
End: 2018-02-07

## 2018-02-07 DIAGNOSIS — O03.9 MISCARRIAGE: Primary | ICD-10-CM

## 2018-02-07 LAB
BACTERIA UR QL AUTO: ABNORMAL /HPF
BILIRUB UR QL STRIP: NEGATIVE
CLARITY UR: ABNORMAL
COLOR UR: YELLOW
GLUCOSE UR STRIP-MCNC: NEGATIVE MG/DL
HGB UR QL STRIP.AUTO: ABNORMAL
HYALINE CASTS UR QL AUTO: ABNORMAL /LPF
KETONES UR QL STRIP: NEGATIVE
LEUKOCYTE ESTERASE UR QL STRIP.AUTO: NEGATIVE
MUCOUS THREADS URNS QL MICRO: ABNORMAL /HPF
NITRITE UR QL STRIP: NEGATIVE
PH UR STRIP.AUTO: 6 [PH] (ref 5–8)
PROT UR QL STRIP: ABNORMAL
RBC # UR: ABNORMAL /HPF
REF LAB TEST METHOD: ABNORMAL
SP GR UR STRIP: 1.02 (ref 1–1.03)
SQUAMOUS #/AREA URNS HPF: ABNORMAL /HPF
UROBILINOGEN UR QL STRIP: ABNORMAL
WBC UR QL AUTO: ABNORMAL /HPF

## 2018-02-07 PROCEDURE — 36415 COLL VENOUS BLD VENIPUNCTURE: CPT

## 2018-02-07 PROCEDURE — 81001 URINALYSIS AUTO W/SCOPE: CPT | Performed by: EMERGENCY MEDICINE

## 2018-02-07 PROCEDURE — 76801 OB US < 14 WKS SINGLE FETUS: CPT

## 2018-02-07 PROCEDURE — 99283 EMERGENCY DEPT VISIT LOW MDM: CPT

## 2018-02-08 VITALS
TEMPERATURE: 98 F | WEIGHT: 90 LBS | BODY MASS INDEX: 15.95 KG/M2 | DIASTOLIC BLOOD PRESSURE: 83 MMHG | HEIGHT: 63 IN | OXYGEN SATURATION: 98 % | RESPIRATION RATE: 18 BRPM | HEART RATE: 95 BPM | SYSTOLIC BLOOD PRESSURE: 117 MMHG

## 2018-02-08 LAB
ALBUMIN SERPL-MCNC: 4 G/DL (ref 3.5–5)
ALBUMIN/GLOB SERPL: 1.2 G/DL (ref 1–2)
ALP SERPL-CCNC: 83 U/L (ref 38–126)
ALT SERPL W P-5'-P-CCNC: 32 U/L (ref 13–69)
ANION GAP SERPL CALCULATED.3IONS-SCNC: 13 MMOL/L
AST SERPL-CCNC: 27 U/L (ref 15–46)
BASOPHILS # BLD AUTO: 0.02 10*3/MM3 (ref 0–0.2)
BASOPHILS NFR BLD AUTO: 0.2 % (ref 0–2.5)
BILIRUB SERPL-MCNC: 0.2 MG/DL (ref 0.2–1.3)
BUN BLD-MCNC: 7 MG/DL (ref 7–20)
BUN/CREAT SERPL: 11.7 (ref 7.1–23.5)
CALCIUM SPEC-SCNC: 8.7 MG/DL (ref 8.4–10.2)
CHLORIDE SERPL-SCNC: 105 MMOL/L (ref 98–107)
CO2 SERPL-SCNC: 25 MMOL/L (ref 26–30)
CREAT BLD-MCNC: 0.6 MG/DL (ref 0.6–1.3)
DEPRECATED RDW RBC AUTO: 46.3 FL (ref 37–54)
EOSINOPHIL # BLD AUTO: 0.04 10*3/MM3 (ref 0–0.7)
EOSINOPHIL NFR BLD AUTO: 0.4 % (ref 0–7)
ERYTHROCYTE [DISTWIDTH] IN BLOOD BY AUTOMATED COUNT: 13.4 % (ref 11.5–14.5)
GFR SERPL CREATININE-BSD FRML MDRD: 126 ML/MIN/1.73
GLOBULIN UR ELPH-MCNC: 3.4 GM/DL
GLUCOSE BLD-MCNC: 103 MG/DL (ref 74–98)
HCG INTACT+B SERPL-ACNC: 5887 MIU/ML
HCT VFR BLD AUTO: 34.6 % (ref 37–47)
HGB BLD-MCNC: 11.6 G/DL (ref 12–16)
IMM GRANULOCYTES # BLD: 0.04 10*3/MM3 (ref 0–0.06)
IMM GRANULOCYTES NFR BLD: 0.4 % (ref 0–0.6)
LYMPHOCYTES # BLD AUTO: 1.43 10*3/MM3 (ref 0.6–3.4)
LYMPHOCYTES NFR BLD AUTO: 14.4 % (ref 10–50)
MCH RBC QN AUTO: 31.4 PG (ref 27–31)
MCHC RBC AUTO-ENTMCNC: 33.5 G/DL (ref 30–37)
MCV RBC AUTO: 93.5 FL (ref 81–99)
MONOCYTES # BLD AUTO: 0.71 10*3/MM3 (ref 0–0.9)
MONOCYTES NFR BLD AUTO: 7.1 % (ref 0–12)
NEUTROPHILS # BLD AUTO: 7.71 10*3/MM3 (ref 2–6.9)
NEUTROPHILS NFR BLD AUTO: 77.5 % (ref 37–80)
NRBC BLD MANUAL-RTO: 0 /100 WBC (ref 0–0)
PLATELET # BLD AUTO: 189 10*3/MM3 (ref 130–400)
PMV BLD AUTO: 10.6 FL (ref 6–12)
POTASSIUM BLD-SCNC: 3 MMOL/L (ref 3.5–5.1)
PROT SERPL-MCNC: 7.4 G/DL (ref 6.3–8.2)
RBC # BLD AUTO: 3.7 10*6/MM3 (ref 4.2–5.4)
SODIUM BLD-SCNC: 140 MMOL/L (ref 137–145)
WBC NRBC COR # BLD: 9.95 10*3/MM3 (ref 4.8–10.8)

## 2018-02-08 PROCEDURE — 85025 COMPLETE CBC W/AUTO DIFF WBC: CPT | Performed by: EMERGENCY MEDICINE

## 2018-02-08 PROCEDURE — 80053 COMPREHEN METABOLIC PANEL: CPT | Performed by: EMERGENCY MEDICINE

## 2018-02-08 PROCEDURE — 84702 CHORIONIC GONADOTROPIN TEST: CPT | Performed by: EMERGENCY MEDICINE

## 2018-02-08 RX ORDER — POTASSIUM CHLORIDE 750 MG/1
40 CAPSULE, EXTENDED RELEASE ORAL ONCE
Status: COMPLETED | OUTPATIENT
Start: 2018-02-08 | End: 2018-02-08

## 2018-02-08 RX ORDER — NAPROXEN 500 MG/1
500 TABLET ORAL ONCE
Status: COMPLETED | OUTPATIENT
Start: 2018-02-08 | End: 2018-02-08

## 2018-02-08 RX ORDER — HYDROCODONE BITARTRATE AND ACETAMINOPHEN 5; 325 MG/1; MG/1
1 TABLET ORAL ONCE
Status: COMPLETED | OUTPATIENT
Start: 2018-02-08 | End: 2018-02-08

## 2018-02-08 RX ORDER — NAPROXEN 500 MG/1
500 TABLET ORAL 2 TIMES DAILY PRN
Qty: 14 TABLET | Refills: 0 | Status: SHIPPED | OUTPATIENT
Start: 2018-02-08

## 2018-02-08 RX ADMIN — POTASSIUM CHLORIDE 40 MEQ: 750 CAPSULE, EXTENDED RELEASE ORAL at 01:27

## 2018-02-08 RX ADMIN — NAPROXEN 500 MG: 500 TABLET ORAL at 01:27

## 2018-02-08 RX ADMIN — HYDROCODONE BITARTRATE AND ACETAMINOPHEN 1 TABLET: 5; 325 TABLET ORAL at 01:27

## 2018-02-08 NOTE — ED PROVIDER NOTES
TRIAGE CHIEF COMPLAINT:     Nursing and triage notes reviewed    Chief Complaint   Patient presents with   • Vaginal Bleeding - Pregnant      HPI: Florencia Vitale is a 22 y.o. female who presents to the emergency department complaining of Vaginal bleeding and abdominal discomfort.  Patient states she is approximately 12 weeks pregnant.  She states this morning she started having a sharp suprapubic and right lower quadrant discomfort.  No radiation of the pain.  Around 6 this evening, several hours prior to arrival she started having some vaginal spotting which has progressed to this a few small clots.  She denies fevers or chills.  She denies pain with urination.  She denies any nausea or vomiting or diarrhea.  Patient has not yet had any OB/GYN care during this pregnancy.  Patient is Rh+.     REVIEW OF SYSTEMS: All other systems reviewed and are negative     PAST MEDICAL HISTORY:   Past Medical History:   Diagnosis Date   • Asthma         FAMILY HISTORY:   Family History   Problem Relation Age of Onset   • Kidney disease Father    • Heart disease Father    • Lung disease Mother    • Seizures Mother    • Autism spectrum disorder Son    • Lung disease Paternal Grandmother    • Heart disease Paternal Grandmother    • Heart disease Maternal Grandmother    • Lung disease Maternal Grandmother    • Lung disease Maternal Grandfather    • Heart disease Maternal Grandfather         SOCIAL HISTORY:   Social History     Social History   • Marital status:      Spouse name: N/A   • Number of children: N/A   • Years of education: N/A     Occupational History   • Not on file.     Social History Main Topics   • Smoking status: Current Every Day Smoker     Packs/day: 0.50     Types: Cigarettes   • Smokeless tobacco: Never Used   • Alcohol use No   • Drug use: No      Comment: STOPPED WHEN FOUND OUT PREGNANT   • Sexual activity: Yes     Partners: Female, Male     Other Topics Concern   • Not on file     Social History Narrative         SURGICAL HISTORY:   History reviewed. No pertinent surgical history.     CURRENT MEDICATIONS:      Medication List      ASK your doctor about these medications          albuterol 108 (90 Base) MCG/ACT inhaler   Commonly known as:  PROVENTIL HFA;VENTOLIN HFA   Inhale 2 puffs Every 6 (Six) Hours As Needed for Wheezing.       amoxicillin 500 MG capsule   Commonly known as:  AMOXIL   Take 1 capsule by mouth 3 (Three) Times a Day.       ibuprofen 800 MG tablet   Commonly known as:  ADVIL,MOTRIN   Take 1 tablet by mouth Every 8 (Eight) Hours.       omeprazole 20 MG capsule   Commonly known as:  priLOSEC   Take 1 capsule by mouth Daily.       ondansetron ODT 4 MG disintegrating tablet   Commonly known as:  ZOFRAN-ODT   Take 1 tablet by mouth Every 6 (Six) Hours As Needed for Nausea or   Vomiting.            ALLERGIES: Review of patient's allergies indicates no known allergies.     PHYSICAL EXAM:   VITAL SIGNS:   Vitals:    02/07/18 2301   BP: 131/80   Pulse: 103   Resp: 16   Temp: 97.6 °F (36.4 °C)   SpO2: 100%      CONSTITUTIONAL: Awake, oriented, appears non-toxic   HENT: Atraumatic, normocephalic, oral mucosa pink and moist, airway patent.   EYES: Conjunctiva clear, EOMI, PERRL   NECK: Trachea midline   CARDIOVASCULAR: Normal heart rate, Normal rhythm, No murmurs, rubs, gallops   PULMONARY/CHEST: Clear to auscultation, no rhonchi, wheezes, or rales. Symmetrical breath sounds.   ABDOMINAL: Non-distended, soft, There is some discomfort in the right lower abdomen - no rebound or guarding. BS normal.   NEUROLOGIC: Non-focal, moving all four extremities, no gross sensory or motor deficits.   EXTREMITIES: No clubbing, cyanosis, or edema   SKIN: Warm, Dry, No erythema, No rash     ED COURSE / MEDICAL DECISION MAKING:   Florencia Vitale is a 22 y.o. female who presents to the emergency department for evaluation of vaginal bleeding.  Vital signs are stable on arrival.  Patient does not appear distressed on arrival.  Does  have some mild tenderness in the lower abdomen with no rebound or guarding.  We will obtain an ultrasound as well as laboratory tests for further evaluation.  Patient's labs reveal hemoglobin of 11.  Other labs unremarkable.  Beta hCG around 5800.  Ultrasound reveals findings of intrauterine embryonic demise.  There is a finger on a pole measuring approximately 8 weeks 5 days but there is no apparent cardiac activity.  I suspect patient is in the process of having a spontaneous miscarriage.  The ultrasound was otherwise unremarkable.  We will refer patient to OB/GYN.  I'll have her return for any new or worsening symptoms.    DECISION TO DISCHARGE/ADMIT: see ED care timeline     FINAL IMPRESSION:   1 -- spontaneous miscarriage   2 --   3 --     Electronically signed by: Ashley Espinoza MD, 2/7/2018 11:39 PM       Ashley Espinoza MD  02/08/18 0122

## 2018-02-09 ENCOUNTER — APPOINTMENT (OUTPATIENT)
Dept: GENERAL RADIOLOGY | Facility: HOSPITAL | Age: 23
End: 2018-02-09

## 2018-02-09 ENCOUNTER — APPOINTMENT (OUTPATIENT)
Dept: CT IMAGING | Facility: HOSPITAL | Age: 23
End: 2018-02-09

## 2018-02-09 ENCOUNTER — HOSPITAL ENCOUNTER (EMERGENCY)
Facility: HOSPITAL | Age: 23
Discharge: HOME | End: 2018-02-09
Attending: EMERGENCY MEDICINE | Admitting: EMERGENCY MEDICINE

## 2018-02-09 VITALS
TEMPERATURE: 99.2 F | HEART RATE: 75 BPM | BODY MASS INDEX: 15.95 KG/M2 | WEIGHT: 90 LBS | DIASTOLIC BLOOD PRESSURE: 76 MMHG | RESPIRATION RATE: 20 BRPM | HEIGHT: 63 IN | OXYGEN SATURATION: 100 % | SYSTOLIC BLOOD PRESSURE: 102 MMHG

## 2018-02-09 DIAGNOSIS — M94.0 ACUTE COSTOCHONDRITIS: ICD-10-CM

## 2018-02-09 DIAGNOSIS — J47.9 BRONCHIECTASIS WITHOUT COMPLICATION (HCC): Primary | ICD-10-CM

## 2018-02-09 LAB
ALBUMIN SERPL-MCNC: 4.1 G/DL (ref 3.5–5)
ALBUMIN/GLOB SERPL: 1.1 G/DL (ref 1–2)
ALP SERPL-CCNC: 98 U/L (ref 38–126)
ALT SERPL W P-5'-P-CCNC: 30 U/L (ref 13–69)
ANION GAP SERPL CALCULATED.3IONS-SCNC: 17.4 MMOL/L
APTT PPP: 28.6 SECONDS (ref 25–36)
AST SERPL-CCNC: 20 U/L (ref 15–46)
BASOPHILS # BLD AUTO: 0.03 10*3/MM3 (ref 0–0.2)
BASOPHILS NFR BLD AUTO: 0.3 % (ref 0–2.5)
BILIRUB SERPL-MCNC: 0.7 MG/DL (ref 0.2–1.3)
BUN BLD-MCNC: 5 MG/DL (ref 7–20)
BUN/CREAT SERPL: 10 (ref 7.1–23.5)
CALCIUM SPEC-SCNC: 9 MG/DL (ref 8.4–10.2)
CHLORIDE SERPL-SCNC: 105 MMOL/L (ref 98–107)
CO2 SERPL-SCNC: 23 MMOL/L (ref 26–30)
CREAT BLD-MCNC: 0.5 MG/DL (ref 0.6–1.3)
DEPRECATED RDW RBC AUTO: 44.7 FL (ref 37–54)
EOSINOPHIL # BLD AUTO: 0.04 10*3/MM3 (ref 0–0.7)
EOSINOPHIL NFR BLD AUTO: 0.4 % (ref 0–7)
ERYTHROCYTE [DISTWIDTH] IN BLOOD BY AUTOMATED COUNT: 13.3 % (ref 11.5–14.5)
GFR SERPL CREATININE-BSD FRML MDRD: >150 ML/MIN/1.73
GLOBULIN UR ELPH-MCNC: 3.7 GM/DL
GLUCOSE BLD-MCNC: 89 MG/DL (ref 74–98)
HCG INTACT+B SERPL-ACNC: 4179.5 MIU/ML
HCT VFR BLD AUTO: 35.2 % (ref 37–47)
HGB BLD-MCNC: 12 G/DL (ref 12–16)
IMM GRANULOCYTES # BLD: 0.04 10*3/MM3 (ref 0–0.06)
IMM GRANULOCYTES NFR BLD: 0.4 % (ref 0–0.6)
INR PPP: 1.24 (ref 0.9–1.1)
LYMPHOCYTES # BLD AUTO: 1.1 10*3/MM3 (ref 0.6–3.4)
LYMPHOCYTES NFR BLD AUTO: 10.4 % (ref 10–50)
MCH RBC QN AUTO: 31.2 PG (ref 27–31)
MCHC RBC AUTO-ENTMCNC: 34.1 G/DL (ref 30–37)
MCV RBC AUTO: 91.4 FL (ref 81–99)
MONOCYTES # BLD AUTO: 0.82 10*3/MM3 (ref 0–0.9)
MONOCYTES NFR BLD AUTO: 7.7 % (ref 0–12)
NEUTROPHILS # BLD AUTO: 8.59 10*3/MM3 (ref 2–6.9)
NEUTROPHILS NFR BLD AUTO: 80.8 % (ref 37–80)
NRBC BLD MANUAL-RTO: 0 /100 WBC (ref 0–0)
PLATELET # BLD AUTO: 216 10*3/MM3 (ref 130–400)
PMV BLD AUTO: 11.1 FL (ref 6–12)
POTASSIUM BLD-SCNC: 3.4 MMOL/L (ref 3.5–5.1)
PROT SERPL-MCNC: 7.8 G/DL (ref 6.3–8.2)
PROTHROMBIN TIME: 13.9 SECONDS (ref 9.3–12.1)
RBC # BLD AUTO: 3.85 10*6/MM3 (ref 4.2–5.4)
SODIUM BLD-SCNC: 142 MMOL/L (ref 137–145)
WBC NRBC COR # BLD: 10.62 10*3/MM3 (ref 4.8–10.8)

## 2018-02-09 PROCEDURE — 93005 ELECTROCARDIOGRAM TRACING: CPT | Performed by: EMERGENCY MEDICINE

## 2018-02-09 PROCEDURE — 0 IOPAMIDOL 61 % SOLUTION: Performed by: EMERGENCY MEDICINE

## 2018-02-09 PROCEDURE — 85610 PROTHROMBIN TIME: CPT | Performed by: PHYSICIAN ASSISTANT

## 2018-02-09 PROCEDURE — 71275 CT ANGIOGRAPHY CHEST: CPT

## 2018-02-09 PROCEDURE — 84702 CHORIONIC GONADOTROPIN TEST: CPT | Performed by: PHYSICIAN ASSISTANT

## 2018-02-09 PROCEDURE — 96374 THER/PROPH/DIAG INJ IV PUSH: CPT

## 2018-02-09 PROCEDURE — 96375 TX/PRO/DX INJ NEW DRUG ADDON: CPT

## 2018-02-09 PROCEDURE — 25010000002 KETOROLAC TROMETHAMINE PER 15 MG: Performed by: PHYSICIAN ASSISTANT

## 2018-02-09 PROCEDURE — 99284 EMERGENCY DEPT VISIT MOD MDM: CPT

## 2018-02-09 PROCEDURE — 85025 COMPLETE CBC W/AUTO DIFF WBC: CPT | Performed by: PHYSICIAN ASSISTANT

## 2018-02-09 PROCEDURE — 85730 THROMBOPLASTIN TIME PARTIAL: CPT | Performed by: PHYSICIAN ASSISTANT

## 2018-02-09 PROCEDURE — 80053 COMPREHEN METABOLIC PANEL: CPT | Performed by: PHYSICIAN ASSISTANT

## 2018-02-09 PROCEDURE — 25010000002 MORPHINE PER 10 MG: Performed by: EMERGENCY MEDICINE

## 2018-02-09 RX ORDER — DOXYCYCLINE 100 MG/1
100 CAPSULE ORAL 2 TIMES DAILY
Qty: 20 CAPSULE | Refills: 0 | OUTPATIENT
Start: 2018-02-09 | End: 2018-12-07

## 2018-02-09 RX ORDER — OXYCODONE AND ACETAMINOPHEN 10; 325 MG/1; MG/1
1 TABLET ORAL ONCE
Status: COMPLETED | OUTPATIENT
Start: 2018-02-09 | End: 2018-02-09

## 2018-02-09 RX ORDER — KETOROLAC TROMETHAMINE 30 MG/ML
30 INJECTION, SOLUTION INTRAMUSCULAR; INTRAVENOUS ONCE
Status: COMPLETED | OUTPATIENT
Start: 2018-02-09 | End: 2018-02-09

## 2018-02-09 RX ORDER — MORPHINE SULFATE 4 MG/ML
4 INJECTION, SOLUTION INTRAMUSCULAR; INTRAVENOUS ONCE
Status: COMPLETED | OUTPATIENT
Start: 2018-02-09 | End: 2018-02-09

## 2018-02-09 RX ORDER — SODIUM CHLORIDE 0.9 % (FLUSH) 0.9 %
10 SYRINGE (ML) INJECTION AS NEEDED
Status: DISCONTINUED | OUTPATIENT
Start: 2018-02-09 | End: 2018-02-10 | Stop reason: HOSPADM

## 2018-02-09 RX ORDER — DEXTROMETHORPHAN HYDROBROMIDE AND PROMETHAZINE HYDROCHLORIDE 15; 6.25 MG/5ML; MG/5ML
5 SYRUP ORAL 4 TIMES DAILY PRN
Qty: 118 ML | Refills: 0 | OUTPATIENT
Start: 2018-02-09 | End: 2018-12-07

## 2018-02-09 RX ADMIN — IOPAMIDOL 85 ML: 612 INJECTION, SOLUTION INTRAVENOUS at 19:40

## 2018-02-09 RX ADMIN — MORPHINE SULFATE 4 MG: 4 INJECTION, SOLUTION INTRAMUSCULAR; INTRAVENOUS at 20:00

## 2018-02-09 RX ADMIN — KETOROLAC TROMETHAMINE 30 MG: 30 INJECTION, SOLUTION INTRAMUSCULAR at 21:24

## 2018-02-09 RX ADMIN — OXYCODONE HYDROCHLORIDE AND ACETAMINOPHEN 1 TABLET: 10; 325 TABLET ORAL at 18:01

## 2018-02-09 NOTE — ED PROVIDER NOTES
"Subjective   HPI Comments: 22-year-old female presenting today complaining of severe right-sided chest wall pain as well as shortness of air and pleuritic pain.    She tells me she's had a cough and \"bronchitis\" for about a week.  She was actually seen here for the same symptoms less than a week ago but did not have any of this severe pain.  An x-ray at that time wasn't performed because the patient was pregnant.  She is a smoker.  She denies any history of lung disease other than asthma.  She tested negative for strep and flu when seen here for the cough last week.  Since that time she returned and was diagnosed with a spontaneous miscarriage and this was confirmed by pelvic ultrasound.  Beta hCG was approximately 5800 a couple of days ago and she had no cardiac activity on ultrasound.  She has not been able to follow up with OB since that time but denies any severe pelvic pain, bleeding, discharge, fever or chills.  She is here tonight complaining of a worsening cough and pain along the right lateral rib cage with movement, coughing or sneezing.  She also said that deep breathing causes severe pain and she feels short of air.  She denies any unilateral leg swelling.  She denies any past history of DVT or PE.  She denies a rash.  No vomiting or diarrhea.       Review of Systems   Constitutional: Negative.    HENT: Negative.    Eyes: Negative.    Respiratory: Positive for cough, shortness of breath and wheezing.    Cardiovascular: Positive for chest pain. Negative for palpitations and leg swelling.   Gastrointestinal: Positive for abdominal pain and nausea. Negative for constipation, diarrhea and vomiting.   Endocrine: Negative.    Genitourinary: Negative.    Musculoskeletal: Negative.  Negative for neck pain.   Skin: Negative.    Allergic/Immunologic: Negative.    Neurological: Negative for dizziness, seizures, syncope, facial asymmetry and headaches.   Hematological: Negative.    Psychiatric/Behavioral: The " patient is nervous/anxious.         Anxious and depressed because of her recent diagnosis of suspected miscarriage   All other systems reviewed and are negative.      Past Medical History:   Diagnosis Date   • Asthma        No Known Allergies    History reviewed. No pertinent surgical history.    Family History   Problem Relation Age of Onset   • Kidney disease Father    • Heart disease Father    • Lung disease Mother    • Seizures Mother    • Autism spectrum disorder Son    • Lung disease Paternal Grandmother    • Heart disease Paternal Grandmother    • Heart disease Maternal Grandmother    • Lung disease Maternal Grandmother    • Lung disease Maternal Grandfather    • Heart disease Maternal Grandfather        Social History     Social History   • Marital status:      Spouse name: N/A   • Number of children: N/A   • Years of education: N/A     Social History Main Topics   • Smoking status: Current Every Day Smoker     Packs/day: 0.50     Types: Cigarettes   • Smokeless tobacco: Never Used   • Alcohol use No   • Drug use: No      Comment: STOPPED WHEN FOUND OUT PREGNANT   • Sexual activity: Yes     Partners: Female, Male     Other Topics Concern   • None     Social History Narrative           Objective   Physical Exam   Constitutional: She is oriented to person, place, and time. No distress.   The patient is very thin young  female crying and voicing significant right-sided chest pain.  Her skin tone-color is good, she is nondiaphoretic and she doesn't appear short of breath.   HENT:   Head: Normocephalic and atraumatic.   Nose: Nose normal.   Mouth/Throat: Oropharynx is clear and moist.   Eyes: Conjunctivae and EOM are normal. No scleral icterus.   Neck: Normal range of motion. Neck supple. No JVD present.   Cardiovascular: Regular rhythm and normal heart sounds.   No extrasystoles are present. Tachycardia present.  Exam reveals no S3 and no S4.    No murmur heard.  Pulmonary/Chest: She exhibits  tenderness (severe right lateral chest wall tenderness along the mid axillary line which reproduces her pain, no rash).   Suboptimal inspiration secondary to pain, she does have some scattered rhonchi and a few expiratory wheezes   Abdominal: Soft. Bowel sounds are normal. She exhibits no distension. There is no tenderness.   Musculoskeletal: Normal range of motion. She exhibits tenderness (rightCHEST WALL TENDERNESS AS NOTED). She exhibits no edema or deformity.   Neurological: She is alert and oriented to person, place, and time. No cranial nerve deficit. She exhibits normal muscle tone. Coordination normal.   Skin: Skin is warm and dry. No rash noted. She is not diaphoretic. No erythema. No pallor.   Psychiatric:   She is very tearful, crying   Vitals reviewed.      Procedures         ED Course  ED Course   Comment By Time   Patient presenting with the above symptoms in the setting of being diagnosed recently with bronchitis and just several days ago with spontaneous miscarriage.  Her quantitative hCG has decreased from 2 days ago.  Because of the shortness of air, tachycardia, chest pain and recent pregnancy I am going to go ahead with CT to rule out pulmonary embolus.  Initially gave her Percocet and her heart rate initially was 140.  Vital signs at 7 PM heart rate was 99. Jb James PA-C 02/09 1912   Patient's CBC shows no significant abnormal findings.  CMP glucose 89 BUN 5 creatinine 0.5 potassium 3.4.  No other abnormal findings on CMP.  INR is 1.24.  Repeat vital signs at approximately 7 PM blood pressure 110/67 temperature 99.2 heart rate 99 oxygen saturation 96 on room air Jb James PA-C 02/09 1913   Currently awaiting formal read of CT.  The patient is voicing continued moderate severe pain.  She is crying when I talk to her.  I will give her IV morphine while I await CT results Jb James PA-C 02/09 1954   CT shows no PE does show significant bronchiectasis and bronchitis of the  left lung.  Given the patient's small stature and recent symptoms, I feel it's possible she has possibly pulled some connective tissue thus explaining her significant reproducible, palpable discomfort as well as the pain with coughing, deep breathing and any movement of the upper torso.  I will give her IV Toradol at this point. Jb James PA-C 02/09 2057   Patient finally achieved some pain relief after Toradol.  She has no PE but the CT does show fairly significant bronchitis and bronchiectasis of the left lung but not the right.  Her pain is all along the right lateral chest wall.  I certainly know that there is a pleuritic component but there is also a palpable musculoskeletal component as well.  I'm going to have her continue with her anti-inflammatory, prescribe something for her cough and will change her amoxicillin to doxycycline aced on today's CT findings.  I told her she needs follow-up with OB and PCP first of the week.  I've given her a work note covering her Saturday Sunday and Monday Jb James PA-C 02/09 2231                  [facility] [place]    Final diagnoses:   Bronchiectasis without complication   Acute costochondritis            Jb James PA-C  02/09/18 2232

## 2018-02-15 ENCOUNTER — HOSPITAL ENCOUNTER (EMERGENCY)
Facility: HOSPITAL | Age: 23
Discharge: HOME | End: 2018-02-16
Attending: EMERGENCY MEDICINE | Admitting: EMERGENCY MEDICINE

## 2018-02-15 DIAGNOSIS — O03.9 MISCARRIAGE: Primary | ICD-10-CM

## 2018-02-15 PROCEDURE — 96375 TX/PRO/DX INJ NEW DRUG ADDON: CPT

## 2018-02-15 PROCEDURE — 80053 COMPREHEN METABOLIC PANEL: CPT | Performed by: EMERGENCY MEDICINE

## 2018-02-15 PROCEDURE — 96374 THER/PROPH/DIAG INJ IV PUSH: CPT

## 2018-02-15 PROCEDURE — 85025 COMPLETE CBC W/AUTO DIFF WBC: CPT | Performed by: EMERGENCY MEDICINE

## 2018-02-15 PROCEDURE — 99284 EMERGENCY DEPT VISIT MOD MDM: CPT

## 2018-02-15 PROCEDURE — 25010000002 KETOROLAC TROMETHAMINE PER 15 MG: Performed by: EMERGENCY MEDICINE

## 2018-02-15 PROCEDURE — 81001 URINALYSIS AUTO W/SCOPE: CPT | Performed by: EMERGENCY MEDICINE

## 2018-02-15 PROCEDURE — 84702 CHORIONIC GONADOTROPIN TEST: CPT | Performed by: EMERGENCY MEDICINE

## 2018-02-15 RX ORDER — HALOPERIDOL 5 MG/ML
2 INJECTION INTRAMUSCULAR ONCE
Status: COMPLETED | OUTPATIENT
Start: 2018-02-15 | End: 2018-02-16

## 2018-02-15 RX ORDER — KETOROLAC TROMETHAMINE 30 MG/ML
30 INJECTION, SOLUTION INTRAMUSCULAR; INTRAVENOUS ONCE
Status: COMPLETED | OUTPATIENT
Start: 2018-02-15 | End: 2018-02-15

## 2018-02-15 RX ORDER — DIPHENHYDRAMINE HYDROCHLORIDE 50 MG/ML
25 INJECTION INTRAMUSCULAR; INTRAVENOUS ONCE
Status: COMPLETED | OUTPATIENT
Start: 2018-02-15 | End: 2018-02-16

## 2018-02-15 RX ADMIN — SODIUM CHLORIDE 1000 ML: 9 INJECTION, SOLUTION INTRAVENOUS at 23:56

## 2018-02-15 RX ADMIN — KETOROLAC TROMETHAMINE 30 MG: 30 INJECTION, SOLUTION INTRAMUSCULAR at 23:57

## 2018-02-16 ENCOUNTER — APPOINTMENT (OUTPATIENT)
Dept: ULTRASOUND IMAGING | Facility: HOSPITAL | Age: 23
End: 2018-02-16

## 2018-02-16 VITALS
TEMPERATURE: 97.9 F | RESPIRATION RATE: 18 BRPM | HEIGHT: 63 IN | BODY MASS INDEX: 15.95 KG/M2 | HEART RATE: 64 BPM | OXYGEN SATURATION: 99 % | SYSTOLIC BLOOD PRESSURE: 103 MMHG | DIASTOLIC BLOOD PRESSURE: 63 MMHG | WEIGHT: 90 LBS

## 2018-02-16 LAB
ALBUMIN SERPL-MCNC: 3.6 G/DL (ref 3.5–5)
ALBUMIN/GLOB SERPL: 1.2 G/DL (ref 1–2)
ALP SERPL-CCNC: 90 U/L (ref 38–126)
ALT SERPL W P-5'-P-CCNC: 31 U/L (ref 13–69)
ANION GAP SERPL CALCULATED.3IONS-SCNC: 16.7 MMOL/L
AST SERPL-CCNC: 25 U/L (ref 15–46)
BACTERIA UR QL AUTO: ABNORMAL /HPF
BASOPHILS # BLD AUTO: 0.04 10*3/MM3 (ref 0–0.2)
BASOPHILS NFR BLD AUTO: 0.4 % (ref 0–2.5)
BILIRUB SERPL-MCNC: 0.2 MG/DL (ref 0.2–1.3)
BILIRUB UR QL STRIP: NEGATIVE
BUN BLD-MCNC: 16 MG/DL (ref 7–20)
BUN/CREAT SERPL: 32 (ref 7.1–23.5)
CALCIUM SPEC-SCNC: 8.9 MG/DL (ref 8.4–10.2)
CHLORIDE SERPL-SCNC: 105 MMOL/L (ref 98–107)
CLARITY UR: CLEAR
CO2 SERPL-SCNC: 23 MMOL/L (ref 26–30)
COD CRY URNS QL: ABNORMAL /HPF
COLOR UR: YELLOW
CREAT BLD-MCNC: 0.5 MG/DL (ref 0.6–1.3)
DEPRECATED RDW RBC AUTO: 45.1 FL (ref 37–54)
EOSINOPHIL # BLD AUTO: 0.02 10*3/MM3 (ref 0–0.7)
EOSINOPHIL NFR BLD AUTO: 0.2 % (ref 0–7)
ERYTHROCYTE [DISTWIDTH] IN BLOOD BY AUTOMATED COUNT: 13.2 % (ref 11.5–14.5)
GFR SERPL CREATININE-BSD FRML MDRD: >150 ML/MIN/1.73
GLOBULIN UR ELPH-MCNC: 3.1 GM/DL
GLUCOSE BLD-MCNC: 123 MG/DL (ref 74–98)
GLUCOSE UR STRIP-MCNC: NEGATIVE MG/DL
HCG INTACT+B SERPL-ACNC: 871.02 MIU/ML
HCT VFR BLD AUTO: 33.3 % (ref 37–47)
HGB BLD-MCNC: 11.2 G/DL (ref 12–16)
HGB UR QL STRIP.AUTO: ABNORMAL
HYALINE CASTS UR QL AUTO: ABNORMAL /LPF
IMM GRANULOCYTES # BLD: 0.04 10*3/MM3 (ref 0–0.06)
IMM GRANULOCYTES NFR BLD: 0.4 % (ref 0–0.6)
KETONES UR QL STRIP: ABNORMAL
LEUKOCYTE ESTERASE UR QL STRIP.AUTO: NEGATIVE
LYMPHOCYTES # BLD AUTO: 2.65 10*3/MM3 (ref 0.6–3.4)
LYMPHOCYTES NFR BLD AUTO: 28 % (ref 10–50)
MCH RBC QN AUTO: 31.2 PG (ref 27–31)
MCHC RBC AUTO-ENTMCNC: 33.6 G/DL (ref 30–37)
MCV RBC AUTO: 92.8 FL (ref 81–99)
MONOCYTES # BLD AUTO: 0.63 10*3/MM3 (ref 0–0.9)
MONOCYTES NFR BLD AUTO: 6.7 % (ref 0–12)
MUCOUS THREADS URNS QL MICRO: ABNORMAL /HPF
NEUTROPHILS # BLD AUTO: 6.08 10*3/MM3 (ref 2–6.9)
NEUTROPHILS NFR BLD AUTO: 64.3 % (ref 37–80)
NITRITE UR QL STRIP: NEGATIVE
NRBC BLD MANUAL-RTO: 0 /100 WBC (ref 0–0)
PH UR STRIP.AUTO: 6 [PH] (ref 5–8)
PLATELET # BLD AUTO: 280 10*3/MM3 (ref 130–400)
PMV BLD AUTO: 10.2 FL (ref 6–12)
POTASSIUM BLD-SCNC: 3.7 MMOL/L (ref 3.5–5.1)
PROT SERPL-MCNC: 6.7 G/DL (ref 6.3–8.2)
PROT UR QL STRIP: ABNORMAL
RBC # BLD AUTO: 3.59 10*6/MM3 (ref 4.2–5.4)
RBC # UR: ABNORMAL /HPF
REF LAB TEST METHOD: ABNORMAL
SODIUM BLD-SCNC: 141 MMOL/L (ref 137–145)
SP GR UR STRIP: >=1.03 (ref 1–1.03)
SQUAMOUS #/AREA URNS HPF: ABNORMAL /HPF
UROBILINOGEN UR QL STRIP: ABNORMAL
WBC NRBC COR # BLD: 9.46 10*3/MM3 (ref 4.8–10.8)
WBC UR QL AUTO: ABNORMAL /HPF

## 2018-02-16 PROCEDURE — 76801 OB US < 14 WKS SINGLE FETUS: CPT

## 2018-02-16 PROCEDURE — 96375 TX/PRO/DX INJ NEW DRUG ADDON: CPT

## 2018-02-16 PROCEDURE — 25010000002 DIPHENHYDRAMINE PER 50 MG: Performed by: EMERGENCY MEDICINE

## 2018-02-16 PROCEDURE — 25010000002 HALOPERIDOL LACTATE PER 5 MG: Performed by: EMERGENCY MEDICINE

## 2018-02-16 RX ADMIN — HALOPERIDOL LACTATE 2 MG: 5 INJECTION, SOLUTION INTRAMUSCULAR at 00:02

## 2018-02-16 RX ADMIN — DIPHENHYDRAMINE HYDROCHLORIDE 25 MG: 50 INJECTION, SOLUTION INTRAMUSCULAR; INTRAVENOUS at 00:00

## 2018-02-16 NOTE — ED PROVIDER NOTES
Subjective   HPI Comments: 22-year-old female presenting with abdominal pain.  She states she was seen here about 4 days ago and told she was having a miscarriage.  Today she followed up with her obstetrician who prescribed misoprostol.  She took her first dose tonight, shortly thereafter she began to have severe cramping in her lower abdomen.  Continues to have bleeding.  She denies any fevers, chills, nausea, vomiting or other complaints.      Review of Systems   Constitutional: Negative for chills and fever.   HENT: Negative for congestion, rhinorrhea and sore throat.    Eyes: Negative for pain.   Respiratory: Negative for cough and shortness of breath.    Cardiovascular: Negative for chest pain, palpitations and leg swelling.   Gastrointestinal: Negative for abdominal pain, diarrhea, nausea and vomiting.   Genitourinary: Positive for pelvic pain and vaginal bleeding. Negative for dysuria.   Musculoskeletal: Negative for arthralgias.   Skin: Negative for rash.   Neurological: Negative for weakness and numbness.   Psychiatric/Behavioral: Negative for behavioral problems.       Past Medical History:   Diagnosis Date   • Asthma        No Known Allergies    History reviewed. No pertinent surgical history.    Family History   Problem Relation Age of Onset   • Kidney disease Father    • Heart disease Father    • Lung disease Mother    • Seizures Mother    • Autism spectrum disorder Son    • Lung disease Paternal Grandmother    • Heart disease Paternal Grandmother    • Heart disease Maternal Grandmother    • Lung disease Maternal Grandmother    • Lung disease Maternal Grandfather    • Heart disease Maternal Grandfather        Social History     Social History   • Marital status:      Spouse name: N/A   • Number of children: N/A   • Years of education: N/A     Social History Main Topics   • Smoking status: Current Every Day Smoker     Packs/day: 0.50     Types: Cigarettes   • Smokeless tobacco: Never Used   •  Alcohol use No   • Drug use: No      Comment: STOPPED WHEN FOUND OUT PREGNANT   • Sexual activity: Yes     Partners: Female, Male     Other Topics Concern   • None     Social History Narrative           Objective   Physical Exam   Constitutional: She is oriented to person, place, and time. She appears well-developed and well-nourished. No distress.   HENT:   Head: Normocephalic and atraumatic.   Right Ear: External ear normal.   Left Ear: External ear normal.   Nose: Nose normal.   Mouth/Throat: Oropharynx is clear and moist.   Eyes: Conjunctivae and EOM are normal. Pupils are equal, round, and reactive to light.   Neck: Normal range of motion. Neck supple.   Cardiovascular: Normal rate, regular rhythm, normal heart sounds and intact distal pulses.    Pulmonary/Chest: Effort normal and breath sounds normal. No respiratory distress.   Abdominal: Soft. Bowel sounds are normal. She exhibits no distension. There is no rebound and no guarding.   Minimal lower abdominal tenderness   Musculoskeletal: Normal range of motion. She exhibits no edema, tenderness or deformity.   Neurological: She is alert and oriented to person, place, and time.   Skin: Skin is warm and dry. No rash noted.   Psychiatric: She has a normal mood and affect. Her behavior is normal.   Nursing note and vitals reviewed.      Procedures         ED Course  ED Course                  MDM  Number of Diagnoses or Management Options  Miscarriage:   Diagnosis management comments: 22-year-old female with abdominal cramps in the setting of active miscarriage and misoprostol use.  Well-developed, well-nourished thin female in no distress with normal vital signs and exam as above.  We'll check labs and pelvic ultrasound.  We'll treat him symptomatically.  Disposition pending workup.    DDX: Miscarriage, medication side effect    Lab work notable for no acute or significant abnormalities.  Quantitative hCG is trended down quite a bit.  Ultrasound reveals debris  within the uterus, no other acute findings.  Previously seen gestational sac is no longer present.  She feels much better, is resting comfortably.  At this time I feel she is safe for discharge home.  Encouraged her keep her appointment with her obstetrician for further evaluation.         Amount and/or Complexity of Data Reviewed  Decide to obtain previous medical records or to obtain history from someone other than the patient: yes        Final diagnoses:   Miscarriage            Nathen Palencia MD  02/16/18 0132

## 2018-02-16 NOTE — DISCHARGE INSTRUCTIONS
Miscarriage  A miscarriage is the loss of an unborn baby (fetus) before the 20th week of pregnancy. The cause is often unknown.  Follow these instructions at home:  · You may need to stay in bed (bed rest), or you may be able to do light activity. Go about activity as told by your doctor.  · Have help at home.  · Write down how many pads you use each day. Write down how soaked they are.  · Do not use tampons. Do not wash out your vagina (douche) or have sex (intercourse) until your doctor approves.  · Only take medicine as told by your doctor.  · Do not take aspirin.  · Keep all doctor visits as told.  · If you or your partner have problems with grieving, talk to your doctor. You can also try counseling. Give yourself time to grieve before trying to get pregnant again.  Get help right away if:  · You have bad cramps or pain in your back or belly (abdomen).  · You have a fever.  · You pass large clumps of blood (clots) from your vagina that are walnut-sized or larger. Save the clumps for your doctor to see.  · You pass large amounts of tissue from your vagina. Save the tissue for your doctor to see.  · You have more bleeding.  · You have thick, bad-smelling fluid (discharge) coming from the vagina.  · You get lightheaded, weak, or you pass out (faint).  · You have chills.  This information is not intended to replace advice given to you by your health care provider. Make sure you discuss any questions you have with your health care provider.  Document Released: 03/11/2013 Document Revised: 05/25/2017 Document Reviewed: 01/17/2013  [employer] Interactive Patient Education © 2017 [employer]      Hypertension  Hypertension, commonly called high blood pressure, is when the force of blood pumping through the arteries is too strong. The arteries are the blood vessels that carry blood from the heart throughout the body. Hypertension forces the heart to work harder to pump blood and may cause arteries to become narrow or  "stiff. Having untreated or uncontrolled hypertension can cause heart attacks, strokes, kidney disease, and other problems.  A blood pressure reading consists of a higher number over a lower number. Ideally, your blood pressure should be below 120/80. The first (\"top\") number is called the systolic pressure. It is a measure of the pressure in your arteries as your heart beats. The second (\"bottom\") number is called the diastolic pressure. It is a measure of the pressure in your arteries as the heart relaxes.  What are the causes?  The cause of this condition is not known.  What increases the risk?  Some risk factors for high blood pressure are under your control. Others are not.  Factors you can change   · Smoking.  · Having type 2 diabetes mellitus, high cholesterol, or both.  · Not getting enough exercise or physical activity.  · Being overweight.  · Having too much fat, sugar, calories, or salt (sodium) in your diet.  · Drinking too much alcohol.  Factors that are difficult or impossible to change   · Having chronic kidney disease.  · Having a family history of high blood pressure.  · Age. Risk increases with age.  · Race. You may be at higher risk if you are -American.  · Gender. Men are at higher risk than women before age 45. After age 65, women are at higher risk than men.  · Having obstructive sleep apnea.  · Stress.  What are the signs or symptoms?  Extremely high blood pressure (hypertensive crisis) may cause:  · Headache.  · Anxiety.  · Shortness of breath.  · Nosebleed.  · Nausea and vomiting.  · Severe chest pain.  · Jerky movements you cannot control (seizures).  How is this diagnosed?  This condition is diagnosed by measuring your blood pressure while you are seated, with your arm resting on a surface. The cuff of the blood pressure monitor will be placed directly against the skin of your upper arm at the level of your heart. It should be measured at least twice using the same arm. Certain " conditions can cause a difference in blood pressure between your right and left arms.  Certain factors can cause blood pressure readings to be lower or higher than normal (elevated) for a short period of time:  · When your blood pressure is higher when you are in a health care provider's office than when you are at home, this is called white coat hypertension. Most people with this condition do not need medicines.  · When your blood pressure is higher at home than when you are in a health care provider's office, this is called masked hypertension. Most people with this condition may need medicines to control blood pressure.  If you have a high blood pressure reading during one visit or you have normal blood pressure with other risk factors:  · You may be asked to return on a different day to have your blood pressure checked again.  · You may be asked to monitor your blood pressure at home for 1 week or longer.  If you are diagnosed with hypertension, you may have other blood or imaging tests to help your health care provider understand your overall risk for other conditions.  How is this treated?  This condition is treated by making healthy lifestyle changes, such as eating healthy foods, exercising more, and reducing your alcohol intake. Your health care provider may prescribe medicine if lifestyle changes are not enough to get your blood pressure under control, and if:  · Your systolic blood pressure is above 130.  · Your diastolic blood pressure is above 80.  Your personal target blood pressure may vary depending on your medical conditions, your age, and other factors.  Follow these instructions at home:  Eating and drinking   · Eat a diet that is high in fiber and potassium, and low in sodium, added sugar, and fat. An example eating plan is called the DASH (Dietary Approaches to Stop Hypertension) diet. To eat this way:  ¨ Eat plenty of fresh fruits and vegetables. Try to fill half of your plate at each meal  with fruits and vegetables.  ¨ Eat whole grains, such as whole wheat pasta, brown rice, or whole grain bread. Fill about one quarter of your plate with whole grains.  ¨ Eat or drink low-fat dairy products, such as skim milk or low-fat yogurt.  ¨ Avoid fatty cuts of meat, processed or cured meats, and poultry with skin. Fill about one quarter of your plate with lean proteins, such as fish, chicken without skin, beans, eggs, and tofu.  ¨ Avoid premade and processed foods. These tend to be higher in sodium, added sugar, and fat.  · Reduce your daily sodium intake. Most people with hypertension should eat less than 1,500 mg of sodium a day.  · Limit alcohol intake to no more than 1 drink a day for nonpregnant women and 2 drinks a day for men. One drink equals 12 oz of beer, 5 oz of wine, or 1½ oz of hard liquor.  Lifestyle   · Work with your health care provider to maintain a healthy body weight or to lose weight. Ask what an ideal weight is for you.  · Get at least 30 minutes of exercise that causes your heart to beat faster (aerobic exercise) most days of the week. Activities may include walking, swimming, or biking.  · Include exercise to strengthen your muscles (resistance exercise), such as pilates or lifting weights, as part of your weekly exercise routine. Try to do these types of exercises for 30 minutes at least 3 days a week.  · Do not use any products that contain nicotine or tobacco, such as cigarettes and e-cigarettes. If you need help quitting, ask your health care provider.  · Monitor your blood pressure at home as told by your health care provider.  · Keep all follow-up visits as told by your health care provider. This is important.  Medicines   · Take over-the-counter and prescription medicines only as told by your health care provider. Follow directions carefully. Blood pressure medicines must be taken as prescribed.  · Do not skip doses of blood pressure medicine. Doing this puts you at risk for  problems and can make the medicine less effective.  · Ask your health care provider about side effects or reactions to medicines that you should watch for.  Contact a health care provider if:  · You think you are having a reaction to a medicine you are taking.  · You have headaches that keep coming back (recurring).  · You feel dizzy.  · You have swelling in your ankles.  · You have trouble with your vision.  Get help right away if:  · You develop a severe headache or confusion.  · You have unusual weakness or numbness.  · You feel faint.  · You have severe pain in your chest or abdomen.  · You vomit repeatedly.  · You have trouble breathing.  Summary  · Hypertension is when the force of blood pumping through your arteries is too strong. If this condition is not controlled, it may put you at risk for serious complications.  · Your personal target blood pressure may vary depending on your medical conditions, your age, and other factors. For most people, a normal blood pressure is less than 120/80.  · Hypertension is treated with lifestyle changes, medicines, or a combination of both. Lifestyle changes include weight loss, eating a healthy, low-sodium diet, exercising more, and limiting alcohol.  This information is not intended to replace advice given to you by your health care provider. Make sure you discuss any questions you have with your health care provider.  Document Released: 12/18/2006 Document Revised: 11/15/2017 Document Reviewed: 11/15/2017  [employer] Interactive Patient Education © 2017 [employer]

## 2018-08-03 ENCOUNTER — HOSPITAL ENCOUNTER (EMERGENCY)
Facility: HOSPITAL | Age: 23
Discharge: HOME | End: 2018-08-03
Attending: EMERGENCY MEDICINE | Admitting: EMERGENCY MEDICINE

## 2018-08-03 VITALS
SYSTOLIC BLOOD PRESSURE: 130 MMHG | HEART RATE: 92 BPM | HEIGHT: 63 IN | OXYGEN SATURATION: 99 % | BODY MASS INDEX: 15.56 KG/M2 | WEIGHT: 87.8 LBS | DIASTOLIC BLOOD PRESSURE: 95 MMHG | RESPIRATION RATE: 18 BRPM | TEMPERATURE: 97.8 F

## 2018-08-03 DIAGNOSIS — N39.0 ACUTE UTI: Primary | ICD-10-CM

## 2018-08-03 LAB
ANION GAP SERPL CALCULATED.3IONS-SCNC: 11.9 MMOL/L (ref 10–20)
B-HCG UR QL: NEGATIVE
BACTERIA UR QL AUTO: ABNORMAL /HPF
BASOPHILS # BLD AUTO: 0.06 10*3/MM3 (ref 0–0.2)
BASOPHILS NFR BLD AUTO: 0.4 % (ref 0–2.5)
BILIRUB UR QL STRIP: NEGATIVE
BUN BLD-MCNC: 10 MG/DL (ref 7–20)
BUN/CREAT SERPL: 14.3 (ref 7.1–23.5)
CALCIUM SPEC-SCNC: 9.2 MG/DL (ref 8.4–10.2)
CHLORIDE SERPL-SCNC: 105 MMOL/L (ref 98–107)
CLARITY UR: ABNORMAL
CO2 SERPL-SCNC: 26 MMOL/L (ref 26–30)
COLOR UR: ABNORMAL
CREAT BLD-MCNC: 0.7 MG/DL (ref 0.6–1.3)
DEPRECATED RDW RBC AUTO: 51.8 FL (ref 37–54)
EOSINOPHIL # BLD AUTO: 0.13 10*3/MM3 (ref 0–0.7)
EOSINOPHIL NFR BLD AUTO: 0.9 % (ref 0–7)
ERYTHROCYTE [DISTWIDTH] IN BLOOD BY AUTOMATED COUNT: 14.7 % (ref 11.5–14.5)
GFR SERPL CREATININE-BSD FRML MDRD: 106 ML/MIN/1.73
GLUCOSE BLD-MCNC: 98 MG/DL (ref 74–98)
GLUCOSE UR STRIP-MCNC: NEGATIVE MG/DL
HCT VFR BLD AUTO: 38 % (ref 37–47)
HGB BLD-MCNC: 12.7 G/DL (ref 12–16)
HGB UR QL STRIP.AUTO: ABNORMAL
HYALINE CASTS UR QL AUTO: ABNORMAL /LPF
IMM GRANULOCYTES # BLD: 0.07 10*3/MM3 (ref 0–0.06)
IMM GRANULOCYTES NFR BLD: 0.5 % (ref 0–0.6)
KETONES UR QL STRIP: ABNORMAL
LEUKOCYTE ESTERASE UR QL STRIP.AUTO: ABNORMAL
LYMPHOCYTES # BLD AUTO: 1.47 10*3/MM3 (ref 0.6–3.4)
LYMPHOCYTES NFR BLD AUTO: 9.9 % (ref 10–50)
MCH RBC QN AUTO: 31.4 PG (ref 27–31)
MCHC RBC AUTO-ENTMCNC: 33.4 G/DL (ref 30–37)
MCV RBC AUTO: 93.8 FL (ref 81–99)
MONOCYTES # BLD AUTO: 0.72 10*3/MM3 (ref 0–0.9)
MONOCYTES NFR BLD AUTO: 4.8 % (ref 0–12)
MUCOUS THREADS URNS QL MICRO: ABNORMAL /HPF
NEUTROPHILS # BLD AUTO: 12.46 10*3/MM3 (ref 2–6.9)
NEUTROPHILS NFR BLD AUTO: 83.5 % (ref 37–80)
NITRITE UR QL STRIP: NEGATIVE
NRBC BLD MANUAL-RTO: 0 /100 WBC (ref 0–0)
PH UR STRIP.AUTO: 6 [PH] (ref 5–8)
PLATELET # BLD AUTO: 206 10*3/MM3 (ref 130–400)
PMV BLD AUTO: 11.1 FL (ref 6–12)
POTASSIUM BLD-SCNC: 3.9 MMOL/L (ref 3.5–5.1)
PROT UR QL STRIP: ABNORMAL
RBC # BLD AUTO: 4.05 10*6/MM3 (ref 4.2–5.4)
RBC # UR: ABNORMAL /HPF
REF LAB TEST METHOD: ABNORMAL
SODIUM BLD-SCNC: 139 MMOL/L (ref 137–145)
SP GR UR STRIP: >=1.03 (ref 1–1.03)
SQUAMOUS #/AREA URNS HPF: ABNORMAL /HPF
UROBILINOGEN UR QL STRIP: ABNORMAL
WBC NRBC COR # BLD: 14.91 10*3/MM3 (ref 4.8–10.8)
WBC UR QL AUTO: ABNORMAL /HPF

## 2018-08-03 PROCEDURE — 81001 URINALYSIS AUTO W/SCOPE: CPT

## 2018-08-03 PROCEDURE — 36415 COLL VENOUS BLD VENIPUNCTURE: CPT

## 2018-08-03 PROCEDURE — 87086 URINE CULTURE/COLONY COUNT: CPT

## 2018-08-03 PROCEDURE — 99283 EMERGENCY DEPT VISIT LOW MDM: CPT

## 2018-08-03 PROCEDURE — 85025 COMPLETE CBC W/AUTO DIFF WBC: CPT | Performed by: NURSE PRACTITIONER

## 2018-08-03 PROCEDURE — 87147 CULTURE TYPE IMMUNOLOGIC: CPT

## 2018-08-03 PROCEDURE — 81025 URINE PREGNANCY TEST: CPT

## 2018-08-03 PROCEDURE — 87186 SC STD MICRODIL/AGAR DIL: CPT

## 2018-08-03 PROCEDURE — 87077 CULTURE AEROBIC IDENTIFY: CPT

## 2018-08-03 PROCEDURE — 80048 BASIC METABOLIC PNL TOTAL CA: CPT | Performed by: NURSE PRACTITIONER

## 2018-08-03 RX ORDER — CEPHALEXIN 250 MG/1
500 CAPSULE ORAL ONCE
Status: COMPLETED | OUTPATIENT
Start: 2018-08-03 | End: 2018-08-03

## 2018-08-03 RX ORDER — FLUCONAZOLE 150 MG/1
150 TABLET ORAL ONCE
Qty: 1 TABLET | Refills: 0 | Status: SHIPPED | OUTPATIENT
Start: 2018-08-03 | End: 2018-08-03

## 2018-08-03 RX ORDER — CEPHALEXIN 500 MG/1
500 CAPSULE ORAL 2 TIMES DAILY
Qty: 14 CAPSULE | Refills: 0 | Status: SHIPPED | OUTPATIENT
Start: 2018-08-03 | End: 2018-08-10

## 2018-08-03 RX ADMIN — CEPHALEXIN 500 MG: 250 CAPSULE ORAL at 15:46

## 2018-08-03 NOTE — ED PROVIDER NOTES
Subjective   22-year-old female patient presents emergency room with 1 day history of hematuria and lower abdominal pain and burning.  Patient states she woke this morning and noticed she had some blood in the toilet when she urinated.  Her first thoughts were that she started her menses however closer examination she discovered that she was not in fact demonstrating that the blood was coming from her urine.  She denies any trauma or injury.  No recent fevers.  No nausea, vomiting.  Pain in her abdomen is characterized as a burning sensation in her bilateral lower quadrants.  She states she is sexually active in a committed monogamous relationship, and currently uses no birth control.  She suffered a spontaneous  in February of this year.  There is no other pertinent health history.  No history of kidney injury, kidney disease dancers of any kind.            Review of Systems  A 10 point review of systems including constitutional, ENT, cardiovascular, respiratory, GI, , musculoskeletal, neuro, skin, psychiatric was performed and it was negative with exception to those detailed in HPI.    Past Medical History:   Diagnosis Date   • Asthma        No Known Allergies    History reviewed. No pertinent surgical history.    Family History   Problem Relation Age of Onset   • Kidney disease Father    • Heart disease Father    • Lung disease Mother    • Seizures Mother    • Autism spectrum disorder Son    • Lung disease Paternal Grandmother    • Heart disease Paternal Grandmother    • Heart disease Maternal Grandmother    • Lung disease Maternal Grandmother    • Lung disease Maternal Grandfather    • Heart disease Maternal Grandfather        Social History     Social History   • Marital status:      Social History Main Topics   • Smoking status: Current Every Day Smoker     Packs/day: 0.50     Types: Cigarettes   • Smokeless tobacco: Never Used   • Alcohol use No   • Drug use: No      Comment: STOPPED WHEN  FOUND OUT PREGNANT   • Sexual activity: Yes     Partners: Female, Male     Other Topics Concern   • Not on file           Objective   Physical Exam  Constitutional: The patient is cooperative. Appears well-developed and well-nourished.  Psychological: Alert and oriented x3.  Patient is anxious.  Eyes: Pupils are equal round reactive to light.  Conjunctiva are within normal limits.  ENT: Oropharynx is clear.  Neck: The neck is supple.  No crepitus.  Full range of motion without pain.  Chest: There is no tenderness to the chest wall.  Respiratory: Respiratory effort was normal.  There is no rales, wheezes, or rhonchi.  There is no stridor.  Air entry is equal.  Cardiovascular: Regular rate and rhythm, no murmurs, gallops, rubs.  Capillary refill is brisk.  Gastrointestinal: Abdomen soft, mild tenderness with deep palpation bilateral lower quadrants, nondistended.  There is no rebound tenderness or guarding.  No organomegaly is noted.  Genitourinary: Not examined  Lymphatic: No gross lymphadenopathy noted.  Musculoskeletal: Musculoskeletal system is grossly intact.  There is no obvious deformity.  Neurological: Edgardo Coma Scale is 15.  Gross motor movement is intact in all 4 extremities.  Patient exhibits normal speech.  Skin: Skin is normal to inspection and palpation.  Warm and dry.  No obvious bruising.  No obvious rash.    Procedures        Lab Results (last 24 hours)     Procedure Component Value Units Date/Time    Basic Metabolic Panel [[id number]]  (Normal) Collected:  08/03/18 1442    Specimen:  Blood Updated:  08/03/18 1503     Glucose 98 mg/dL      BUN 10 mg/dL      Creatinine 0.70 mg/dL      Sodium 139 mmol/L      Potassium 3.9 mmol/L      Chloride 105 mmol/L      CO2 26.0 mmol/L      Calcium 9.2 mg/dL      eGFR Non African Amer 106 mL/min/1.73      BUN/Creatinine Ratio 14.3     Anion Gap 11.9 mmol/L     Narrative:       GFR Normal >60  Chronic Kidney Disease <60  Kidney Failure <15    CBC & Differential  [[id number]] Collected:  08/03/18 1442    Specimen:  Blood Updated:  08/03/18 1449    Narrative:       The following orders were created for panel order CBC & Differential.  Procedure                               Abnormality         Status                     ---------                               -----------         ------                     CBC Auto Differential[[id number]]        Abnormal            Final result                 Please view results for these tests on the individual orders.    CBC Auto Differential [[id number]]  (Abnormal) Collected:  08/03/18 1442    Specimen:  Blood Updated:  08/03/18 1449     WBC 14.91 (H) 10*3/mm3      RBC 4.05 (L) 10*6/mm3      Hemoglobin 12.7 g/dL      Hematocrit 38.0 %      MCV 93.8 fL      MCH 31.4 (H) pg      MCHC 33.4 g/dL      RDW 14.7 (H) %      RDW-SD 51.8 fl      MPV 11.1 fL      Platelets 206 10*3/mm3      Neutrophil % 83.5 (H) %      Lymphocyte % 9.9 (L) %      Monocyte % 4.8 %      Eosinophil % 0.9 %      Basophil % 0.4 %      Immature Grans % 0.5 %      Neutrophils, Absolute 12.46 (H) 10*3/mm3      Lymphocytes, Absolute 1.47 10*3/mm3      Monocytes, Absolute 0.72 10*3/mm3      Eosinophils, Absolute 0.13 10*3/mm3      Basophils, Absolute 0.06 10*3/mm3      Immature Grans, Absolute 0.07 (H) 10*3/mm3      nRBC 0.0 /100 WBC     Urinalysis, Microscopic Only - Urine, Clean Catch [[id number]]  (Abnormal) Collected:  08/03/18 1415    Specimen:  Urine from Urine, Clean Catch Updated:  08/03/18 1434     RBC, UA Too Numerous to Count (A) /HPF      WBC, UA 21-30 (A) /HPF      Bacteria, UA 1+ (A) /HPF      Squamous Epithelial Cells, UA 0-2 /HPF      Hyaline Casts, UA None Seen /LPF      Mucus, UA Trace /HPF      Methodology Manual Light Microscopy    Urine Culture - Urine, [[id number]] Collected:  08/03/18 1415    Specimen:  Urine from Urine, Clean Catch Updated:  08/03/18 1434    Pregnancy, Urine - Urine, Clean Catch [[id number]]  (Normal) Collected:  08/03/18 1419    Specimen:   Urine from Urine, Clean Catch Updated:  08/03/18 1427     HCG, Urine QL Negative    Urinalysis With Culture If Indicated - Urine, Clean Catch [[id number]]  (Abnormal) Collected:  08/03/18 1415    Specimen:  Urine from Urine, Clean Catch Updated:  08/03/18 1426     Color, UA Geovanna (A)     Appearance, UA Cloudy (A)     pH, UA 6.0     Specific Gravity, UA >=1.030     Glucose, UA Negative     Ketones, UA Trace (A)     Bilirubin, UA Negative     Blood, UA Large (3+) (A)     Protein, UA >=300 mg/dL (3+) (A)     Leuk Esterase, UA Small (1+) (A)     Nitrite, UA Negative     Urobilinogen, UA 1.0 E.U./dL          Imaging Results (last 24 hours)     ** No results found for the last 24 hours. **            ED Course      Laboratory studies consistent with a urinary tract infection.  Patient has a large amount of leukocytes as well as 1+ bacteria in her urine.  There is large amount of blood as well.  We'll initiate antibiotic therapy by patient with antifungal treatment as well.  No indication for renal calculi.  Surgical patient to take her entire course of antibiotics (7 days), she has no primary care provider will be provided with a list of local providers from whom she can choose to establish herself for ongoing care.  Discussed plan of care the patient, she verbalized understanding was in agreement with this plan.          MDM      Final diagnoses:   Acute UTI            Tona Arevalo, APRN  08/03/18 3146

## 2018-08-03 NOTE — DISCHARGE INSTRUCTIONS
Take Medications as prescribed.  Tylenol/Motrin for minor pain or fever management.   It is important to complete course of antibiotics, even if you are feeling better.  Follow up with Primary Care in 3-5 days if symptoms persist or worsen.  Provided with a list of local providers from which you can choose to establish yourself for care moving forward.  Return to the emergency room for uncontrolled pain, nausea, vomiting,  chest pain, shortness or palpitations.    Thank you for allowing us to participate in your care today; we know that you have a choice in healthcare and  appreciate your confidence in [facility].    You have been supplied with 2 new prescription(s) today.

## 2018-08-06 ENCOUNTER — TELEPHONE (OUTPATIENT)
Dept: EMERGENCY DEPT | Facility: HOSPITAL | Age: 23
End: 2018-08-06

## 2018-08-06 LAB — BACTERIA SPEC AEROBE CULT: ABNORMAL

## 2018-08-06 RX ORDER — SULFAMETHOXAZOLE AND TRIMETHOPRIM 800; 160 MG/1; MG/1
1 TABLET ORAL 2 TIMES DAILY
Qty: 10 TABLET | Refills: 0 | Status: SHIPPED | OUTPATIENT
Start: 2018-08-06 | End: 2018-08-11

## 2018-08-06 NOTE — TELEPHONE ENCOUNTER
Discussed the change in antibiotic, informed the patient to stop the current antibiotic and switched to Bactrim, antibiotic sent electronically to [facility].

## 2019-01-04 ENCOUNTER — HOSPITAL ENCOUNTER (EMERGENCY)
Facility: HOSPITAL | Age: 24
Discharge: HOME | End: 2019-01-04
Attending: STUDENT IN AN ORGANIZED HEALTH CARE EDUCATION/TRAINING PROGRAM | Admitting: STUDENT IN AN ORGANIZED HEALTH CARE EDUCATION/TRAINING PROGRAM

## 2019-01-04 VITALS
OXYGEN SATURATION: 98 % | HEIGHT: 63 IN | HEART RATE: 97 BPM | SYSTOLIC BLOOD PRESSURE: 139 MMHG | DIASTOLIC BLOOD PRESSURE: 67 MMHG | WEIGHT: 98 LBS | BODY MASS INDEX: 17.36 KG/M2 | TEMPERATURE: 98 F | RESPIRATION RATE: 18 BRPM

## 2019-01-04 DIAGNOSIS — J06.9 UPPER RESPIRATORY TRACT INFECTION, UNSPECIFIED TYPE: Primary | ICD-10-CM

## 2019-01-04 DIAGNOSIS — H92.01 ACUTE EAR PAIN, RIGHT: ICD-10-CM

## 2019-01-04 PROCEDURE — 99283 EMERGENCY DEPT VISIT LOW MDM: CPT

## 2019-01-04 NOTE — ED PROVIDER NOTES
Subjective   23-year-old female presents reporting that she's had cold symptoms for several days and has had right ear pain for the past 4 days.  States it can be quite severe.  Nothing makes his better or worse.            Review of Systems   All other systems reviewed and are negative.      Past Medical History:   Diagnosis Date   • Asthma        No Known Allergies    History reviewed. No pertinent surgical history.    Family History   Problem Relation Age of Onset   • Kidney disease Father    • Heart disease Father    • Lung disease Mother    • Seizures Mother    • Autism spectrum disorder Son    • Lung disease Paternal Grandmother    • Heart disease Paternal Grandmother    • Heart disease Maternal Grandmother    • Lung disease Maternal Grandmother    • Lung disease Maternal Grandfather    • Heart disease Maternal Grandfather        Social History     Socioeconomic History   • Marital status:      Spouse name: Not on file   • Number of children: Not on file   • Years of education: Not on file   • Highest education level: Not on file   Tobacco Use   • Smoking status: Current Every Day Smoker     Packs/day: 0.50     Types: Cigarettes   • Smokeless tobacco: Never Used   Substance and Sexual Activity   • Alcohol use: No   • Drug use: No     Comment: STOPPED WHEN FOUND OUT PREGNANT   • Sexual activity: Yes     Partners: Female, Male           Objective   Physical Exam   Nursing note and vitals reviewed.  GEN: No acute distress  Head: Normocephalic, atraumatic  Eyes: Pupils equal round reactive to light  ENT: No congestion on exam, Posterior pharynx normal in appearance, oral mucosa is moist  Chest: Nontender to palpation  Cardiovascular: Regular rate  Lungs: Clear to auscultation bilaterally  Abdomen: Soft, nontender, nondistended, no peritoneal signs  Extremities: No edema, normal appearance  Neuro: GCS 15  Psych: Mood and affect are appropriate    Procedures           ED Course                  MDM  Number of  Diagnoses or Management Options  Acute ear pain, right:   Upper respiratory tract infection, unspecified type:   Diagnosis management comments: Lateral tympanic membranes were normal in appearance.  I do believe the ear pain patient experiences likely related to eustachian tube dysfunction secondary to viral URI.       Amount and/or Complexity of Data Reviewed  Decide to obtain previous medical records or to obtain history from someone other than the patient: yes  Review and summarize past medical records: yes          Final diagnoses:   Upper respiratory tract infection, unspecified type   Acute ear pain, right            Mk Esteves MD  01/04/19 0153

## 2019-07-24 NOTE — IP AVS SNAPSHOT
AFTER VISIT SUMMARY             Florencia Vitale           About your hospitalization     You were discharged on:  January 18, 2017 You last received care in the:  [facility] LABOR DELIVERY     Unit phone number:  [phone]       Medications    If you or your caregiver advised us that you are currently taking a medication and that medication is marked below as “Resume”, this simply indicates that we have reviewed those medications to make sure our new therapy recommendations do not interfere.  If you have concerns about medications other than those new ones which we are prescribing today, please consult the physician who prescribed them (or your primary physician).  Our review of your home medications is not meant to indicate that we are directing their use.             Your Medications      ASK your doctor about these medications           Morning Noon Evening Bedtime As Needed    acetaminophen 325 MG tablet   Take 650 mg by mouth Every 6 (Six) Hours As Needed for mild pain (1-3).   Commonly known as:  TYLENOL                                         Instructions for After Discharge        Scheduled Appointments     Jan 23, 2017  4:00 PM EST   NST with COR LD PROCEDURE ROOM   [facility] LABOR AND DELIVERT PROCEDURES ([place])    [address] [id number] [phone]               Relevant Prenatal Information          Facts About Your Prenatal Visit (All Dating Information Is Approximate)     Due Date How Far Along Am I? Weight Gain Since Prior Visit (1/9/2017)       2/19/2017 35 weeks 3 days -2 lb (-0.9 kg)        Summary of Your Hospitalization        Reason for Hospitalization     Your primary diagnosis was:  Not on File    Your diagnoses also included:  Pregnancy      Care Providers     Provider Service Role Specialty    Leon Cooney,  -- Attending Provider Obstetrics and Gynecology      Your Allergies  Date Reviewed: 1/18/2017    No active allergies      [facility]  Signup     [facility] [place], operated by [facility] [employer] allows you to send messages to your doctor, view your test results, renew your prescriptions, schedule appointments, and more. To sign up, go to [employer] and click on the Sign Up Now link in the New User? box. Enter your [employer] Activation Code exactly as it appears below along with the last four digits of your Social Security Number and your Date of Birth () to complete the sign-up process. If you do not sign up before the expiration date, you must request a new code.    Long Play Activation Code: [email or web address]  Expires: 2017 11:34 AM    If you have questions, you can email [email or web address] or call [phone] to talk to our [employer] staff. Remember, [employer] Play is NOT to be used for urgent needs. For medical emergencies, dial 911.          Information Regarding Fetal Kick Counts     Fetal Movement Counts  Patient Name: __________________________________________________   Patient Due Date: ____________________  Performing a fetal movement count is highly recommended in high-risk pregnancies, but it is good for every pregnant woman to do. Your caregiver may ask you to start counting fetal movements at 28 weeks of the pregnancy. Fetal movements often increase:  · After eating a full meal.  · After physical activity.  · After eating or drinking something sweet or cold.  · At rest.  Pay attention to when you feel the baby is most active. This will help you notice a pattern of your baby's sleep and wake cycles and what factors contribute to an increase in fetal movement. It is important to perform a fetal movement count at the same time each day when your baby is normally most active.    HOW TO COUNT FETAL MOVEMENTS  1. Find a quiet and comfortable area to sit or lie down on your left side. Lying on your left side provides the best blood and oxygen circulation to your baby.  2. Write down the day and time on a sheet of paper or in a journal.  3. Start  counting kicks, flutters, swishes, rolls, or jabs in a 2 hour period. You should feel at least 10 movements within 2 hours.  4. If you do not feel 10 movements in 2 hours, wait 2-3 hours and count again. Look for a change in the pattern or not enough counts in 2 hours.  SEEK MEDICAL CARE IF:  · You feel less than 10 counts in 2 hours, tried twice.  · There is no movement in over an hour.  · The pattern is changing or taking longer each day to reach 10 counts in 2 hours.  · You feel the baby is not moving as he or she usually does.    Date  Movements  Start Time  Finish Time    Date  Movements  Start Time  Finish Time    Date  Movements  Start Time  Finish Time    Date  Movements  Start Time  Finish Time    Date  Movements  Start Time  Finish Time    Date  Movements  Start Time  Finish Time      Date  Movements  Start Time  Finish Time    Date  Movements  Start Time  Finish Time    Date  Movements  Start Time  Finish Time    Date  Movements  Start Time  Finish Time    Date  Movements  Start Time  Finish Time    Date  Movements  Start Time  Finish Time      Date  Movements  Start Time  Finish Time    Date  Movements  Start Time  Finish Time    Date  Movements  Start Time  Finish Time    Date  Movements  Start Time  Finish Time    Date  Movements  Start Time  Finish Time    Date  Movements  Start Time  Finish Time    Date  Movements  Start Time  Finish Time    Date  Movements  Start Time  Finish Time    Date  Movements  Start Time  Finish Time    Date  Movements  Start Time  Finish Time    Date  Movements  Start Time  Finish Time    Date  Movements  Start Time  Finish Time      Document Released: 01/17/2008   Document Revised: 12/04/2013   Document Reviewed: 10/14/2013    ExitCare® Patient Information ©2015 [employer]. This information is not intended to replace advice given to you by your health care provider. Make sure you discuss any questions you have with your health care provider.             SYMPTOMS OF A  STROKE    Call 911 or have someone take you to the Emergency Department if you have any of the following:    · Sudden numbness or weakness of your face, arm or leg especially on one side of the body  · Sudden confusion, diffiiculty speaking or trouble understanding   · Changes in your vision or loss of sight in one eye  · Sudden severe headache with no known cause  · sudden dizziness, trouble walking, loss of balance or coordination    It is important to seek emergency care right away if you have further stroke symptoms. If you get emergency help quickly, the powerful clot-dissolving medicines can reduce the disabilities caused by a stroke.     For more information:    American Stroke Association  2-882-7-STROKE  www.strokeassociation.org           IF YOU SMOKE OR USE TOBACCO PLEASE READ THE FOLLOWING:    Why is smoking bad for me?  Smoking increases the risk of heart disease, lung disease, vascular disease, stroke, and cancer.     If you smoke, STOP!    If you would like more information on quitting smoking, please visit the [employer] website: [email or web address]-[email or web address]   This link will provide additional resources including the QUIT line and the Beat the Pack support groups.     For more information:    American Cancer Society  (153) 432-6839    American Heart Association  1-203.883.7689               YOU ARE THE MOST IMPORTANT FACTOR IN YOUR RECOVERY.     Follow all instructions carefully.     I have reviewed my discharge instructions with my nurse, including the following information, if applicable:     Information about my illness and diagnosis   Follow up appointments (including lab draws)   Wound Care   Equipment Needs   Medications (new and continuing) along with side effects   Preventative information such as vaccines and smoking cessations   Diet   Pain   I know when to contact my Doctor's office or seek emergency care      I want my nurse to describe the side  No effects of my medications: YES NO   If the answer is no, I understand the side effects of my medications: YES NO   My nurse described the side effects of my medications in a way that I could understand: YES NO   I have taken my personal belongings and my own medications with me at discharge: YES NO            Should a home visit be schedule with you:  a notification from your provider will be made prior to the visit.  If you have any questions or concerns about the visit, contact your provider.      I have received this information and my questions have been answered. I have discussed any concerns I see with this plan with the nurse or physician. I understand these instructions.    Signature of Patient or Responsible Person: _____________________________________    Date: _________________  Time: __________________    Signature of Healthcare Provider: _______________________________________  Date: _________________  Time: __________________